# Patient Record
Sex: MALE | Race: WHITE | Employment: FULL TIME | URBAN - METROPOLITAN AREA
[De-identification: names, ages, dates, MRNs, and addresses within clinical notes are randomized per-mention and may not be internally consistent; named-entity substitution may affect disease eponyms.]

---

## 2017-03-06 ENCOUNTER — OFFICE VISIT - HEALTHEAST (OUTPATIENT)
Dept: INTERNAL MEDICINE | Facility: CLINIC | Age: 62
End: 2017-03-06

## 2017-03-06 DIAGNOSIS — M72.2 PLANTAR FASCIITIS: ICD-10-CM

## 2017-09-08 ENCOUNTER — RECORDS - HEALTHEAST (OUTPATIENT)
Dept: ADMINISTRATIVE | Facility: OTHER | Age: 62
End: 2017-09-08

## 2017-10-03 ENCOUNTER — AMBULATORY - HEALTHEAST (OUTPATIENT)
Dept: INTERNAL MEDICINE | Facility: CLINIC | Age: 62
End: 2017-10-03

## 2018-02-20 ENCOUNTER — COMMUNICATION - HEALTHEAST (OUTPATIENT)
Dept: INTERNAL MEDICINE | Facility: CLINIC | Age: 63
End: 2018-02-20

## 2018-02-21 ENCOUNTER — OFFICE VISIT - HEALTHEAST (OUTPATIENT)
Dept: INTERNAL MEDICINE | Facility: CLINIC | Age: 63
End: 2018-02-21

## 2018-02-21 DIAGNOSIS — Z13.220 SCREENING FOR HYPERLIPIDEMIA: ICD-10-CM

## 2018-02-21 DIAGNOSIS — Z13.1 SCREENING FOR DIABETES MELLITUS: ICD-10-CM

## 2018-02-21 DIAGNOSIS — E88.09 HYPOALBUMINEMIA: ICD-10-CM

## 2018-02-21 DIAGNOSIS — Z12.5 SCREENING FOR PROSTATE CANCER: ICD-10-CM

## 2018-02-21 DIAGNOSIS — Z12.11 SCREEN FOR COLON CANCER: ICD-10-CM

## 2018-02-21 DIAGNOSIS — M54.12 CERVICAL RADICULOPATHY: ICD-10-CM

## 2018-02-21 ASSESSMENT — MIFFLIN-ST. JEOR: SCORE: 1561.03

## 2018-03-06 ENCOUNTER — HOSPITAL ENCOUNTER (OUTPATIENT)
Dept: PHYSICAL MEDICINE AND REHAB | Facility: CLINIC | Age: 63
Discharge: HOME | End: 2018-03-06
Attending: INTERNAL MEDICINE

## 2018-03-06 ENCOUNTER — OFFICE VISIT - HEALTHEAST (OUTPATIENT)
Dept: PHYSICAL THERAPY | Facility: REHABILITATION | Age: 63
End: 2018-03-06

## 2018-03-06 DIAGNOSIS — M54.2 CERVICALGIA: ICD-10-CM

## 2018-03-06 DIAGNOSIS — M79.18 MYOFASCIAL PAIN: ICD-10-CM

## 2018-03-06 DIAGNOSIS — M79.18 MYOFASCIAL MUSCLE PAIN: ICD-10-CM

## 2018-03-06 DIAGNOSIS — R29.3 POOR POSTURE: ICD-10-CM

## 2018-03-06 DIAGNOSIS — M54.12 CERVICAL RADICULAR PAIN: ICD-10-CM

## 2018-03-06 DIAGNOSIS — M54.12 CERVICAL RADICULOPATHY AT C7: ICD-10-CM

## 2018-03-06 ASSESSMENT — MIFFLIN-ST. JEOR: SCORE: 1533.81

## 2018-03-07 ENCOUNTER — COMMUNICATION - HEALTHEAST (OUTPATIENT)
Dept: PHYSICAL MEDICINE AND REHAB | Facility: CLINIC | Age: 63
End: 2018-03-07

## 2018-03-09 ENCOUNTER — RECORDS - HEALTHEAST (OUTPATIENT)
Dept: ADMINISTRATIVE | Facility: OTHER | Age: 63
End: 2018-03-09

## 2018-03-13 ENCOUNTER — OFFICE VISIT - HEALTHEAST (OUTPATIENT)
Dept: PHYSICAL THERAPY | Facility: REHABILITATION | Age: 63
End: 2018-03-13

## 2018-03-13 DIAGNOSIS — M54.12 CERVICAL RADICULOPATHY AT C7: ICD-10-CM

## 2018-03-13 DIAGNOSIS — R29.3 POOR POSTURE: ICD-10-CM

## 2018-03-13 DIAGNOSIS — M79.18 MYOFASCIAL MUSCLE PAIN: ICD-10-CM

## 2018-03-21 ENCOUNTER — COMMUNICATION - HEALTHEAST (OUTPATIENT)
Dept: INTERNAL MEDICINE | Facility: CLINIC | Age: 63
End: 2018-03-21

## 2018-08-14 ENCOUNTER — AMBULATORY - HEALTHEAST (OUTPATIENT)
Dept: INTERNAL MEDICINE | Facility: CLINIC | Age: 63
End: 2018-08-14

## 2018-08-14 ENCOUNTER — HOSPITAL ENCOUNTER (OUTPATIENT)
Dept: RADIOLOGY | Facility: CLINIC | Age: 63
Discharge: HOME | End: 2018-08-14
Attending: INTERNAL MEDICINE

## 2018-08-14 DIAGNOSIS — R07.81 RIB PAIN ON LEFT SIDE: ICD-10-CM

## 2018-11-11 ENCOUNTER — COMMUNICATION - HEALTHEAST (OUTPATIENT)
Dept: INTERNAL MEDICINE | Facility: CLINIC | Age: 63
End: 2018-11-11

## 2018-11-27 ENCOUNTER — OFFICE VISIT - HEALTHEAST (OUTPATIENT)
Dept: INTERNAL MEDICINE | Facility: CLINIC | Age: 63
End: 2018-11-27

## 2018-11-27 DIAGNOSIS — R20.0 HAND NUMBNESS: ICD-10-CM

## 2018-11-27 DIAGNOSIS — Z13.220 SCREENING FOR HYPERLIPIDEMIA: ICD-10-CM

## 2018-11-27 DIAGNOSIS — M54.2 CERVICAL PAIN (NECK): ICD-10-CM

## 2018-11-27 DIAGNOSIS — Z00.00 ROUTINE GENERAL MEDICAL EXAMINATION AT A HEALTH CARE FACILITY: ICD-10-CM

## 2018-11-27 DIAGNOSIS — Z12.11 ENCOUNTER FOR SCREENING COLONOSCOPY: ICD-10-CM

## 2018-11-27 DIAGNOSIS — E88.09 PROTEINS SERUM PLASMA LOW: ICD-10-CM

## 2018-11-27 DIAGNOSIS — Z13.1 SCREENING FOR DIABETES MELLITUS: ICD-10-CM

## 2018-11-27 DIAGNOSIS — Z12.5 SCREENING FOR PROSTATE CANCER: ICD-10-CM

## 2018-11-27 LAB
ALBUMIN SERPL-MCNC: 4.1 G/DL (ref 3.5–5)
ALP SERPL-CCNC: 64 U/L (ref 45–120)
ALT SERPL W P-5'-P-CCNC: 21 U/L (ref 0–45)
ANION GAP SERPL CALCULATED.3IONS-SCNC: 10 MMOL/L (ref 5–18)
AST SERPL W P-5'-P-CCNC: 26 U/L (ref 0–40)
BILIRUB SERPL-MCNC: 0.7 MG/DL (ref 0–1)
BUN SERPL-MCNC: 16 MG/DL (ref 8–22)
CALCIUM SERPL-MCNC: 9.8 MG/DL (ref 8.5–10.5)
CHLORIDE BLD-SCNC: 103 MMOL/L (ref 98–107)
CHOLEST SERPL-MCNC: 225 MG/DL
CO2 SERPL-SCNC: 28 MMOL/L (ref 22–31)
CREAT SERPL-MCNC: 0.97 MG/DL (ref 0.7–1.3)
ERYTHROCYTE [DISTWIDTH] IN BLOOD BY AUTOMATED COUNT: 12 % (ref 11–14.5)
FASTING STATUS PATIENT QL REPORTED: YES
GFR SERPL CREATININE-BSD FRML MDRD: >60 ML/MIN/1.73M2
GLUCOSE BLD-MCNC: 98 MG/DL (ref 70–125)
HBA1C MFR BLD: 5.6 % (ref 3.5–6)
HCT VFR BLD AUTO: 47.9 % (ref 40–54)
HDLC SERPL-MCNC: 83 MG/DL
HGB BLD-MCNC: 15.9 G/DL (ref 14–18)
LDLC SERPL CALC-MCNC: 128 MG/DL
MCH RBC QN AUTO: 31.1 PG (ref 27–34)
MCHC RBC AUTO-ENTMCNC: 33.2 G/DL (ref 32–36)
MCV RBC AUTO: 94 FL (ref 80–100)
PLATELET # BLD AUTO: 189 THOU/UL (ref 140–440)
PMV BLD AUTO: 7 FL (ref 7–10)
POTASSIUM BLD-SCNC: 3.9 MMOL/L (ref 3.5–5)
PROT SERPL-MCNC: 7.1 G/DL (ref 6–8)
PSA SERPL-MCNC: 1.4 NG/ML (ref 0–4.5)
RBC # BLD AUTO: 5.1 MILL/UL (ref 4.4–6.2)
SODIUM SERPL-SCNC: 141 MMOL/L (ref 136–145)
TRIGL SERPL-MCNC: 68 MG/DL
TSH SERPL DL<=0.005 MIU/L-ACNC: 0.98 UIU/ML (ref 0.3–5)
WBC: 7.7 THOU/UL (ref 4–11)

## 2018-11-27 RX ORDER — AZITHROMYCIN 500 MG/1
500 TABLET, FILM COATED ORAL DAILY
Qty: 6 TABLET | Refills: 0 | Status: SHIPPED | OUTPATIENT
Start: 2018-11-27 | End: 2021-12-10

## 2018-11-27 RX ORDER — ATOVAQUONE AND PROGUANIL HYDROCHLORIDE 250; 100 MG/1; MG/1
1 TABLET, FILM COATED ORAL
Qty: 18 TABLET | Refills: 0 | Status: SHIPPED | OUTPATIENT
Start: 2018-11-27 | End: 2021-12-10

## 2018-11-27 RX ORDER — ZOLPIDEM TARTRATE 10 MG/1
5-10 TABLET ORAL
Qty: 15 TABLET | Refills: 0 | Status: SHIPPED | OUTPATIENT
Start: 2018-11-27 | End: 2021-12-10

## 2018-11-27 ASSESSMENT — MIFFLIN-ST. JEOR: SCORE: 1548.56

## 2018-12-04 ENCOUNTER — AMBULATORY - HEALTHEAST (OUTPATIENT)
Dept: INTERNAL MEDICINE | Facility: CLINIC | Age: 63
End: 2018-12-04

## 2018-12-04 ENCOUNTER — HOSPITAL ENCOUNTER (OUTPATIENT)
Dept: MRI IMAGING | Facility: CLINIC | Age: 63
Discharge: HOME | End: 2018-12-04
Attending: INTERNAL MEDICINE

## 2018-12-04 ENCOUNTER — HOSPITAL ENCOUNTER (OUTPATIENT)
Dept: RADIOLOGY | Facility: CLINIC | Age: 63
Discharge: HOME | End: 2018-12-04
Attending: INTERNAL MEDICINE

## 2018-12-04 DIAGNOSIS — R20.0 HAND NUMBNESS: ICD-10-CM

## 2018-12-04 DIAGNOSIS — M54.2 CERVICAL PAIN (NECK): ICD-10-CM

## 2018-12-04 DIAGNOSIS — M47.22 OSTEOARTHRITIS OF SPINE WITH RADICULOPATHY, CERVICAL REGION: ICD-10-CM

## 2018-12-19 ENCOUNTER — HOSPITAL ENCOUNTER (OUTPATIENT)
Dept: PHYSICAL MEDICINE AND REHAB | Facility: CLINIC | Age: 63
Discharge: HOME | End: 2018-12-19
Attending: PHYSICAL MEDICINE & REHABILITATION

## 2018-12-19 DIAGNOSIS — R20.2 ARM PARESTHESIA, LEFT: ICD-10-CM

## 2018-12-19 DIAGNOSIS — M54.12 CERVICAL RADICULAR PAIN: ICD-10-CM

## 2018-12-19 DIAGNOSIS — M48.02 NEURAL FORAMINAL STENOSIS OF CERVICAL SPINE: ICD-10-CM

## 2018-12-19 DIAGNOSIS — M50.30 DDD (DEGENERATIVE DISC DISEASE), CERVICAL: ICD-10-CM

## 2018-12-19 ASSESSMENT — MIFFLIN-ST. JEOR: SCORE: 1547.42

## 2018-12-27 ENCOUNTER — HOSPITAL ENCOUNTER (OUTPATIENT)
Dept: PHYSICAL MEDICINE AND REHAB | Facility: CLINIC | Age: 63
Discharge: HOME | End: 2018-12-27
Attending: PHYSICAL MEDICINE & REHABILITATION

## 2018-12-27 DIAGNOSIS — R20.2 ARM PARESTHESIA, LEFT: ICD-10-CM

## 2018-12-27 DIAGNOSIS — M48.02 NEURAL FORAMINAL STENOSIS OF CERVICAL SPINE: ICD-10-CM

## 2018-12-27 DIAGNOSIS — M54.12 CERVICAL RADICULAR PAIN: ICD-10-CM

## 2018-12-27 ASSESSMENT — MIFFLIN-ST. JEOR: SCORE: 1547.42

## 2019-01-02 ENCOUNTER — COMMUNICATION - HEALTHEAST (OUTPATIENT)
Dept: INTERNAL MEDICINE | Facility: CLINIC | Age: 64
End: 2019-01-02

## 2019-05-23 ENCOUNTER — RECORDS - HEALTHEAST (OUTPATIENT)
Dept: ADMINISTRATIVE | Facility: OTHER | Age: 64
End: 2019-05-23

## 2019-07-18 ENCOUNTER — RECORDS - HEALTHEAST (OUTPATIENT)
Dept: ADMINISTRATIVE | Facility: OTHER | Age: 64
End: 2019-07-18

## 2019-12-03 ENCOUNTER — RECORDS - HEALTHEAST (OUTPATIENT)
Dept: ADMINISTRATIVE | Facility: OTHER | Age: 64
End: 2019-12-03

## 2020-05-05 ENCOUNTER — OFFICE VISIT - HEALTHEAST (OUTPATIENT)
Dept: INTERNAL MEDICINE | Facility: CLINIC | Age: 65
End: 2020-05-05

## 2020-05-05 DIAGNOSIS — R10.13 DYSPEPSIA: ICD-10-CM

## 2020-05-07 ENCOUNTER — AMBULATORY - HEALTHEAST (OUTPATIENT)
Dept: LAB | Facility: CLINIC | Age: 65
End: 2020-05-07

## 2020-05-07 DIAGNOSIS — R10.13 DYSPEPSIA: ICD-10-CM

## 2020-05-07 LAB
ALBUMIN SERPL-MCNC: 4 G/DL (ref 3.5–5)
ALBUMIN UR-MCNC: NEGATIVE MG/DL
ALP SERPL-CCNC: 60 U/L (ref 45–120)
ALT SERPL W P-5'-P-CCNC: 24 U/L (ref 0–45)
ANION GAP SERPL CALCULATED.3IONS-SCNC: 6 MMOL/L (ref 5–18)
APPEARANCE UR: CLEAR
AST SERPL W P-5'-P-CCNC: 22 U/L (ref 0–40)
BILIRUB SERPL-MCNC: 0.5 MG/DL (ref 0–1)
BILIRUB UR QL STRIP: NEGATIVE
BUN SERPL-MCNC: 13 MG/DL (ref 8–22)
CALCIUM SERPL-MCNC: 9.6 MG/DL (ref 8.5–10.5)
CHLORIDE BLD-SCNC: 103 MMOL/L (ref 98–107)
CO2 SERPL-SCNC: 30 MMOL/L (ref 22–31)
COLOR UR AUTO: NORMAL
CREAT SERPL-MCNC: 0.97 MG/DL (ref 0.7–1.3)
ERYTHROCYTE [DISTWIDTH] IN BLOOD BY AUTOMATED COUNT: 12.7 % (ref 11–14.5)
GFR SERPL CREATININE-BSD FRML MDRD: >60 ML/MIN/1.73M2
GLUCOSE BLD-MCNC: 94 MG/DL (ref 70–125)
GLUCOSE UR STRIP-MCNC: NEGATIVE MG/DL
HCT VFR BLD AUTO: 47.9 % (ref 40–54)
HGB BLD-MCNC: 15.3 G/DL (ref 14–18)
HGB UR QL STRIP: NEGATIVE
KETONES UR STRIP-MCNC: NEGATIVE MG/DL
LEUKOCYTE ESTERASE UR QL STRIP: NEGATIVE
LIPASE SERPL-CCNC: 17 U/L (ref 0–52)
MCH RBC QN AUTO: 30.7 PG (ref 27–34)
MCHC RBC AUTO-ENTMCNC: 31.9 G/DL (ref 32–36)
MCV RBC AUTO: 96 FL (ref 80–100)
NITRATE UR QL: NEGATIVE
PH UR STRIP: 5.5 [PH] (ref 4.5–8)
PLATELET # BLD AUTO: 197 THOU/UL (ref 140–440)
PMV BLD AUTO: 9.1 FL (ref 8.5–12.5)
POTASSIUM BLD-SCNC: 4.2 MMOL/L (ref 3.5–5)
PROT SERPL-MCNC: 7.3 G/DL (ref 6–8)
RBC # BLD AUTO: 4.98 MILL/UL (ref 4.4–6.2)
SODIUM SERPL-SCNC: 139 MMOL/L (ref 136–145)
SP GR UR STRIP: 1.01 (ref 1–1.03)
UROBILINOGEN UR STRIP-ACNC: NORMAL
WBC: 4.2 THOU/UL (ref 4–11)

## 2020-05-15 ENCOUNTER — RECORDS - HEALTHEAST (OUTPATIENT)
Dept: ADMINISTRATIVE | Facility: OTHER | Age: 65
End: 2020-05-15

## 2020-05-22 ENCOUNTER — AMBULATORY - HEALTHEAST (OUTPATIENT)
Dept: INTERNAL MEDICINE | Facility: CLINIC | Age: 65
End: 2020-05-22

## 2020-05-22 DIAGNOSIS — K30 DELAYED GASTRIC EMPTYING: ICD-10-CM

## 2020-06-05 ENCOUNTER — RECORDS - HEALTHEAST (OUTPATIENT)
Dept: ADMINISTRATIVE | Facility: OTHER | Age: 65
End: 2020-06-05

## 2020-08-03 ENCOUNTER — VIRTUAL VISIT (OUTPATIENT)
Dept: FAMILY MEDICINE | Facility: OTHER | Age: 65
End: 2020-08-03
Payer: COMMERCIAL

## 2020-08-04 ENCOUNTER — APPOINTMENT (OUTPATIENT)
Dept: LAB | Facility: CLINIC | Age: 65
End: 2020-08-04

## 2020-08-04 ENCOUNTER — RESULTS ONLY (OUTPATIENT)
Dept: LAB | Age: 65
End: 2020-08-04

## 2020-08-04 LAB
SARS-COV-2 RNA SPEC QL NAA+PROBE: NOT DETECTED
SPECIMEN SOURCE: NORMAL

## 2020-08-05 NOTE — PROGRESS NOTES
"Date: 2020 14:49:50  Clinician: Tenzin Small  Clinician NPI: [id number]  Patient: Scott Saucedo  Patient : 1955  Patient Address: [address] [place] [address]  Patient Phone: [phone]  Visit Protocol: URI  Patient Summary:  Scott is a 64 year old ( : 1955 ) male who initiated a Visit for COVID-19 (Coronavirus) evaluation and screening. When asked the question \"Please sign me up to receive news, health information and promotions from [employer].\", Scott responded \"No\".    Scott states his symptoms started today.   His symptoms consist of a sore throat and a cough.   Symptom details     Cough: Scott coughs a few times an hour and his cough is not more bothersome at night. Phlegm does not come into his throat when he coughs. He does not believe his cough is caused by post-nasal drip.     Sore throat: Scott reports having mild throat pain (1-3 on a 10 point pain scale), does not have exudate on his tonsils, and can swallow liquids. The lymph nodes in his neck are not enlarged. A rash has not appeared on the skin since the sore throat started.      Scott denies having wheezing, nausea, teeth pain, ageusia, diarrhea, myalgias, anosmia, facial pain or pressure, fever, nasal congestion, vomiting, rhinitis, malaise, ear pain, headache, chills, and enlarged lymph nodes. He also denies having recent facial or sinus surgery in the past 60 days and taking antibiotic medication in the past month. He is not experiencing dyspnea.   Precipitating events  Scott is not sure if he has been exposed to someone with strep throat. He has not recently been exposed to someone with influenza. Scott has been in close contact with the following high risk individuals: adults 65 or older, people with asthma, heart disease or diabetes, and children under the age of 5.   Pertinent COVID-19 (Coronavirus) information  In the past 14 days, Scott has not worked in a congregate living setting.   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 He provides direct patient care. 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 He lives with a healthcare worker.   Scott has not had a close contact with a laboratory-confirmed COVID-19 patient within 14 days of symptom onset.   Pertinent medical history  Scott does not need a return to work/school note.   Weight: 157 lbs   Scott does not smoke or use smokeless tobacco.   Weight: 157 lbs    MEDICATIONS: Prilosec OTC oral, ALLERGIES: NKDA  Clinician Response:  Dear Scott,   Your symptoms show that you may have coronavirus (COVID-19). This illness can cause fever, cough and trouble breathing. Many people get a mild case and get better on their own. Some people can get very sick.  What should I do?  We would like to test you for this virus.   1. Please call [phone] to schedule your visit. Explain that you were referred by [facility] to have a COVID-19 test. Be ready to share your [facility] visit ID number.  The following will serve as your written order for this COVID Test, ordered by me, for the indication of suspected COVID [Z20.828]: The test will be ordered in [employer] [facility], our electronic health record, after you are scheduled. It will show as ordered and authorized by Ga Wang MD.  Order: COVID-19 (Coronavirus) PCR for [facility]TOMATIC [facility] from [facility].      2. When it's time for your COVID test:  Stay at least 6 feet away from others. (If someone will drive you to your test, stay in the backseat, as far away from the  as you can.)   Cover your mouth and nose with a mask, tissue or washcloth.  Go straight to the testing site. Don't make any stops on the way there or back.      3.Starting now: Stay home and away from others (self-isolate) until:    "You've had no fever---and no medicine that reduces fever---for 3 full days (72 hours). And...   Your other symptoms have gotten better. For example, your cough or breathing has improved. And...   At least 10 days have passed since your symptoms started.       During this time, don't leave the house except for testing or medical care.   Stay in your own room, even for meals. Use your own bathroom if you can.   Stay away from others in your home. No hugging, kissing or shaking hands. No visitors.  Don't go to work, school or anywhere else.    Clean \"high touch\" surfaces often (doorknobs, counters, handles, etc.). Use a household cleaning spray or wipes. You'll find a full list of  on the EPA website: www.epa.gov/pesticide-registration/list-n-disinfectants-use-against-sars-cov-2.   Cover your mouth and nose with a mask, tissue or washcloth to avoid spreading germs.  Wash your hands and face often. Use soap and water.  Caregivers in these groups are at risk for severe illness due to COVID-19:  o People 65 years and older  o People who live in a nursing home or long-term care facility  o People with chronic disease (lung, heart, cancer, diabetes, kidney, liver, immunologic)  o People who have a weakened immune system, including those who:   Are in cancer treatment  Take medicine that weakens the immune system, such as corticosteroids  Had a bone marrow or organ transplant  Have an immune deficiency  Have poorly controlled HIV or AIDS  Are obese (body mass index of 40 or higher)  Smoke regularly   o Caregivers should wear gloves while washing dishes, handling laundry and cleaning bedrooms and bathrooms.  o Use caution when washing and drying laundry: Don't shake dirty laundry, and use the warmest water setting that you can.  o For more tips, go to www.cdc.gov/coronavirus/2019-ncov/downloads/10Things.pdf.    4.Sign up for [employer]. We know it's scary to hear that you might have COVID-19. We want to track your " symptoms to make sure you're okay over the next 2 weeks. Please look for an email from [employer]---this is a free, online program that we'll use to keep in touch. To sign up, follow the link in the email. Learn more at [email or web address]  How can I take care of myself?   Get lots of rest. Drink extra fluids (unless a doctor has told you not to).   Take Tylenol (acetaminophen) for fever or pain. If you have liver or kidney problems, ask your family doctor if it's okay to take Tylenol.   Adults can take either:    650 mg (two 325 mg pills) every 4 to 6 hours, or...   1,000 mg (two 500 mg pills) every 8 hours as needed.    Note: Don't take more than 3,000 mg in one day. Acetaminophen is found in many medicines (both prescribed and over-the-counter medicines). Read all labels to be sure you don't take too much.   For children, check the Tylenol bottle for the right dose. The dose is based on the child's age or weight.    If you have other health problems (like cancer, heart failure, an organ transplant or severe kidney disease): Call your specialty clinic if you don't feel better in the next 2 days.       Know when to call 911. Emergency warning signs include:    Trouble breathing or shortness of breath Pain or pressure in the chest that doesn't go away Feeling confused like you haven't felt before, or not being able to wake up Bluish-colored lips or face.  Where can I get more information?   [facility] -- About COVID-19: [email or web address]   CDC -- What to Do If You're Sick: www.cdc.gov/coronavirus/2019-ncov/about/steps-when-sick.html   CDC -- Ending Home Isolation: www.cdc.gov/coronavirus/2019-ncov/hcp/disposition-in-home-patients.html   CDC -- Caring for Someone: www.cdc.gov/coronavirus/2019-ncov/if-you-are-sick/care-for-someone.html   [facility] -- Interim Guidance for Hospital Discharge to Home: www.health.[facility].mn./diseases/coronavirus/hcp/hospdischarge.pdf   [facility]  [place] clinical trials (COVID-19 research studies): clinicalaffairs.[facility]-clinical-trials    Below are the COVID-19 hotlines at the [facility] of Health ([facility]). Interpreters are available.    For health questions: Call [phone] or 1-997.275.4747 (7 a.m. to 7 p.m.) For questions about schools and childcare: Call 630-821-3559 or 1-438.938.7316 (7 a.m. to 7 p.m.)    Diagnosis: Pain in throat  Diagnosis ICD: R07.0

## 2020-10-12 ENCOUNTER — COMMUNICATION - HEALTHEAST (OUTPATIENT)
Dept: INTERNAL MEDICINE | Facility: CLINIC | Age: 65
End: 2020-10-12

## 2020-10-12 ENCOUNTER — RESULTS ONLY (OUTPATIENT)
Dept: LAB | Age: 65
End: 2020-10-12

## 2020-10-12 ENCOUNTER — APPOINTMENT (OUTPATIENT)
Dept: LAB | Facility: CLINIC | Age: 65
End: 2020-10-12

## 2020-10-12 LAB
SARS-COV-2 RNA SPEC QL NAA+PROBE: NORMAL
SPECIMEN SOURCE: NORMAL

## 2020-10-13 LAB
LABORATORY COMMENT REPORT: NORMAL
SARS-COV-2 RNA SPEC QL NAA+PROBE: NEGATIVE
SPECIMEN SOURCE: NORMAL

## 2020-11-08 ENCOUNTER — HEALTH MAINTENANCE LETTER (OUTPATIENT)
Age: 65
End: 2020-11-08

## 2020-12-01 ENCOUNTER — HOSPITAL ENCOUNTER (OUTPATIENT)
Dept: PHYSICAL MEDICINE AND REHAB | Facility: CLINIC | Age: 65
Discharge: HOME | End: 2020-12-01
Attending: PHYSICAL MEDICINE & REHABILITATION

## 2020-12-01 DIAGNOSIS — M48.02 NEURAL FORAMINAL STENOSIS OF CERVICAL SPINE: ICD-10-CM

## 2020-12-01 DIAGNOSIS — R20.2 ARM PARESTHESIA, LEFT: ICD-10-CM

## 2020-12-01 DIAGNOSIS — M50.30 DDD (DEGENERATIVE DISC DISEASE), CERVICAL: ICD-10-CM

## 2021-02-04 ENCOUNTER — AMBULATORY - HEALTHEAST (OUTPATIENT)
Dept: INTERNAL MEDICINE | Facility: CLINIC | Age: 66
End: 2021-02-04

## 2021-02-04 ENCOUNTER — AMBULATORY - HEALTHEAST (OUTPATIENT)
Dept: LAB | Facility: CLINIC | Age: 66
End: 2021-02-04

## 2021-02-04 DIAGNOSIS — Z13.1 SCREENING FOR DIABETES MELLITUS: ICD-10-CM

## 2021-02-04 DIAGNOSIS — R10.13 DYSPEPSIA: ICD-10-CM

## 2021-02-04 DIAGNOSIS — Z12.5 SCREENING FOR PROSTATE CANCER: ICD-10-CM

## 2021-02-04 DIAGNOSIS — Z13.220 SCREENING FOR HYPERLIPIDEMIA: ICD-10-CM

## 2021-02-04 LAB
ALBUMIN SERPL-MCNC: 4.3 G/DL (ref 3.5–5)
ALP SERPL-CCNC: 67 U/L (ref 45–120)
ALT SERPL W P-5'-P-CCNC: 23 U/L (ref 0–45)
ANION GAP SERPL CALCULATED.3IONS-SCNC: 9 MMOL/L (ref 5–18)
AST SERPL W P-5'-P-CCNC: 25 U/L (ref 0–40)
BILIRUB SERPL-MCNC: 0.7 MG/DL (ref 0–1)
BUN SERPL-MCNC: 17 MG/DL (ref 8–22)
CALCIUM SERPL-MCNC: 9.3 MG/DL (ref 8.5–10.5)
CHLORIDE BLD-SCNC: 104 MMOL/L (ref 98–107)
CHOLEST SERPL-MCNC: 215 MG/DL
CO2 SERPL-SCNC: 28 MMOL/L (ref 22–31)
CREAT SERPL-MCNC: 0.97 MG/DL (ref 0.7–1.3)
ERYTHROCYTE [DISTWIDTH] IN BLOOD BY AUTOMATED COUNT: 13 % (ref 11–14.5)
FASTING STATUS PATIENT QL REPORTED: YES
GFR SERPL CREATININE-BSD FRML MDRD: >60 ML/MIN/1.73M2
GLUCOSE BLD-MCNC: 92 MG/DL (ref 70–125)
HBA1C MFR BLD: 5.3 %
HCT VFR BLD AUTO: 48.7 % (ref 40–54)
HDLC SERPL-MCNC: 85 MG/DL
HGB BLD-MCNC: 15.6 G/DL (ref 14–18)
LDLC SERPL CALC-MCNC: 121 MG/DL
MCH RBC QN AUTO: 30.8 PG (ref 27–34)
MCHC RBC AUTO-ENTMCNC: 32 G/DL (ref 32–36)
MCV RBC AUTO: 96 FL (ref 80–100)
PLATELET # BLD AUTO: 178 THOU/UL (ref 140–440)
PMV BLD AUTO: 8.8 FL (ref 7–10)
POTASSIUM BLD-SCNC: 4.1 MMOL/L (ref 3.5–5)
PROT SERPL-MCNC: 6.9 G/DL (ref 6–8)
PSA SERPL-MCNC: 2.1 NG/ML (ref 0–4.5)
RBC # BLD AUTO: 5.07 MILL/UL (ref 4.4–6.2)
SODIUM SERPL-SCNC: 141 MMOL/L (ref 136–145)
TRIGL SERPL-MCNC: 43 MG/DL
WBC: 4 THOU/UL (ref 4–11)

## 2021-05-30 VITALS — WEIGHT: 156.2 LBS | BODY MASS INDEX: 21.79 KG/M2

## 2021-06-01 VITALS — HEIGHT: 71 IN | BODY MASS INDEX: 22.12 KG/M2 | WEIGHT: 158 LBS

## 2021-06-01 VITALS — WEIGHT: 164 LBS | BODY MASS INDEX: 22.96 KG/M2 | HEIGHT: 71 IN

## 2021-06-02 VITALS — WEIGHT: 161 LBS | HEIGHT: 71 IN | BODY MASS INDEX: 22.54 KG/M2

## 2021-06-02 VITALS — HEIGHT: 71 IN | BODY MASS INDEX: 22.57 KG/M2 | WEIGHT: 161.25 LBS

## 2021-06-02 VITALS — WEIGHT: 161 LBS | BODY MASS INDEX: 22.54 KG/M2 | HEIGHT: 71 IN

## 2021-06-07 NOTE — PROGRESS NOTES
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      The patient has been notified of following:     \"This video visit will be conducted via a call between you and your physician/provider. We have found that certain health care needs can be provided without the need for an in-person physical exam.  This service lets us provide the care you need with a video conversation.  If a prescription is necessary we can send it directly to your pharmacy.  If lab work is needed we can place an order for that and you can then stop by our lab to have the test done at a later time.    Video visits are billed at different rates depending on your insurance coverage. Please reach out to your insurance provider with any questions.    If during the course of the call the physician/provider feels a video visit is not appropriate, you will not be charged for this service.\"    Patient has given verbal consent to a Video visit? Yes    Patient would like to receive their AVS by AVS Preference: [employer].    Patient would like the video invitation sent by: Text to cell phone: Per contact    Will anyone else be joining your video visit? No        Video Start Time: 8:30AM    Additional provider notes: GENERAL: healthy, alert and no distress  EYES: Eyes grossly normal to inspection, conjunctivae and sclerae normal  RESP: no audible wheeze, cough, or visible cyanosis.  No visible retractions or increased work of breathing.  Able to speak fully in complete sentences.  NEURO: Cranial nerves grossly intact, mentation intact and speech normal  PSYCH: mentation appears normal, affect normal/bright, judgement and insight intact, normal speech and appearance well-groomed      Video-Visit Details    Type of service:  Video Visit    Video End Time (time video stopped): 8:40AM  Originating Location (pt. Location): Home    Distant Location (provider location):  [facility] INTERNAL MEDICINE     Platform used for Video Visit: " Other: Face time      Scott Ramirez MD       Video Visit - Follow Up   Scott Saucedo MD   64 y.o. male    Date of Visit: 5/5/2020    Chief Complaint   Patient presents with     Consult        Assessment and Plan   1. Dyspepsia  Likely some gastritis or esophagitis/reflux.  Continue with omeprazole, reduction in fatty foods and alcohol in the evening, elevation of bed.  Check labs as below and I would recommend an upper endoscopy at some point in the near future.  Further evaluation if anything abnormal below.  - Ambulatory referral for Upper GI Endoscopy  - Lipase; Future  - Comprehensive Metabolic Panel; Future  - HM2(CBC w/o Differential); Future  - Urinalysis-UC if Indicated; Future      Return in about 2 weeks (around 5/19/2020) for video visit if symptoms fail to improve.     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  Started about a week ago.  Some pain in the epigastric area a little bit left of the epigastric area.  Has had a bit of a sore throat.  Some cottonmouth.  Some belching.  Some reflux symptoms as well.  3 days ago he started Prilosec.  He has reduce the amount of alcohol he drinks in the evening.  Some improvement.  He has had no difficulty swallowing.  Pain is not worsened with eating, actually improved.  No weight loss.  Mild nausea without vomiting.  No diarrhea constipation blood in the stool or black tarry stool.  No fever chills cough or body aches.  He does not use significant amount of NSAIDs.  Alcohol consumption usually 1-3 drinks per night.  Upper endoscopy many decades ago unremarkable.    Review of Systems: A comprehensive review of systems was negative except as noted.     Medications, Allergies and Problem List   Reviewed, reconciled and updated  Post Discharge Medication Reconciliation Status:      Additional Information   Current Outpatient Medications   Medication Sig Dispense Refill      atovaquone-proguanil (MALARONE) 250-100 mg Tab Take 1 tablet by mouth daily with breakfast. 18 tablet 0     azithromycin (ZITHROMAX) 500 MG tablet Take 1 tablet (500 mg total) by mouth daily For 1-3 days for traveler's diarrhea. 6 tablet 0     zolpidem (AMBIEN) 10 mg tablet Take 0.5-1 tablets (5-10 mg total) by mouth at bedtime as needed for sleep. 15 tablet 0     No current facility-administered medications for this visit.      No Known Allergies  Social History     Tobacco Use     Smoking status: Never Smoker     Smokeless tobacco: Never Used   Substance Use Topics     Alcohol use: Yes     Alcohol/week: 14.0 standard drinks     Types: 14 Standard drinks or equivalent per week     Drug use: No       Review and/or order of clinical lab tests:  Review and/or order of radiology tests:  Review and/or order of medicine tests:  Discussion of test results with performing physician:  Decision to obtain old records and/or obtain history from someone other than the patient:  Review and summarization of old records and/or obtaining history from someone other than the patient and.or discussion of case with another health care provider:  Independent visualization of image, tracing or specimen itself:    Time:      Scott Ramirez MD

## 2021-06-09 NOTE — PROGRESS NOTES
ASSESSMENT:    Plantar fasciitis, left foot    PLAN:  Eccentric gastrocnemius and soleus exercises were demonstrated.  He is going to try these at home for a couple of weeks and if he is not improved at that time he will contact me and we will get him set up for physical therapy.  Problem List Items Addressed This Visit     None          There are no discontinued medications.    No Follow-up on file.    There are no Patient Instructions on file for this visit.    CHIEF COMPLAINT:  Chief Complaint   Patient presents with     Heel Pain     Lt. foot problem (heel)       HISTORY OF PRESENT ILLNESS:  Scott Saucedo is a 61 y.o. male presenting to the clinic today with left foot pain. His left foot pain first presented 4-5 weeks ago and has progressively worsened since. His pain presents over the plantar, medial aspect of his left foot along his calcaneous. His pain worsens while standing for extended periods but never resolves fully. His pain is mildly worse in the morning. His pain has slowly progressed along the medial aspect of his foot and into his achilles. He denies any injury which precipitated his pain. He requests home therapy exercises for treatment of his symptoms.     REVIEW OF SYSTEMS:   He denies right foot pain. He feels well physically. All other systems are negative.    PFSH:  He presented to Dr. Scott Ramirez at the [facility] on 9/14/16 for his annual physical exam.   No Known Allergies    TOBACCO USE:  History   Smoking Status     Never Smoker   Smokeless Tobacco     Not on file       VITALS:  Vitals:    03/06/17 1319   BP: 110/68   Patient Site: Right Arm   Patient Position: Sitting   Cuff Size: Adult Regular   Pulse: 82   Weight: 156 lb 3.2 oz (70.9 kg)     Wt Readings from Last 3 Encounters:   03/06/17 156 lb 3.2 oz (70.9 kg)   09/14/16 158 lb (71.7 kg)         PHYSICAL EXAM:  Constitutional:  Reveals an alert and oriented x3, pleasant male with no acute distress.   MUSCULOSKELETAL:  Left foot: Significant tenderness with palpation over dorsomedial insertion of plantar fascia over calcaneous; anterior drawer is negative.       ADDITIONAL HISTORY SUMMARIZED (FROM OLD RECORDS OR HISTORY FROM SOMEONE OTHER THAN THE PATIENT OR ANOTHER HEALTHCARE PROVIDER), COLONOSCOPY (2 TOTAL): Reviewed annual physical exam note from Dr. Ramirez at [facility] on 9/14/16 regarding health maintenance.     DECISION TO OBTAIN EXTRA INFORMATION (OLD RECORDS REQUESTED OR HISTORY FROM ANOTHER PERSON OR ACCESSING CARE EVERYWHERE) (1 TOTAL):none    RADIOLOGY TESTS SUMMARIZED OR ORDERED (XRAY/CT/MRI/DXA/MAMMO) (1 TOTAL): none    LABS REVIEWED OR ORDERED (1 TOTAL): Reviewed labs from 9/14/16 and 9/29/16.    MEDICINE TESTS SUMMARIZED OR ORDERED (EKG/ECHO/EGD) (1 TOTAL): none    INDEPENDENT REVIEW OF EKG OR X-RAY (2 EACH): none      The visit lasted a total of 6 minutes face to face with the patient. Over 50% of the time was spent counseling and educating the patient about left plantar fascitis.    I, Octavio Ochoa, am scribing for and in the presence of, Dr. Ethan Saucedo.    I, Dr. Ethan Saucedo, personally performed the services described in this documentation, as scribed by Octavio Ochoa in my presence, and it is both accurate and complete.    MEDICATIONS:  No current outpatient prescriptions on file.     No current facility-administered medications for this visit.        Total data points: 3

## 2021-06-13 NOTE — PROGRESS NOTES
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      The patient has been notified of following:     \"This video visit will be conducted via a call between you and your physician/provider. We have found that certain health care needs can be provided without the need for an in-person physical exam.  This service lets us provide the care you need with a video conversation.  If a prescription is necessary we can send it directly to your pharmacy.  If lab work is needed we can place an order for that and you can then stop by our lab to have the test done at a later time.    Video visits are billed at different rates depending on your insurance coverage. Please reach out to your insurance provider with any questions.    If during the course of the call the physician/provider feels a video visit is not appropriate, you will not be charged for this service.\"    Patient has given verbal consent to a Video visit? Yes  How would you like to obtain your AVS? AVS Preference: [employer].  If dropped by the video visit, the video invitation should be sent to: Text to cell phone: [phone]  Will anyone else be joining your video visit? No        Video Start Time: 10:41 AM    Additional provider notes:     Assessment/Plan:      Diagnoses and all orders for this visit:    Arm paresthesia, left    Neural foraminal stenosis of cervical spine    DDD (degenerative disc disease), cervical        Assessment: Pleasant 64 y.o. male  Pleasant otherwise healthy 62-year-old gentleman with a history of posterior cervical decompression with:        1.  Persistent left arm and hand paresthesias to digits 2.  This is a constant paresthesia related to  severe foraminal stenosis on the left at C5-6 and moderate to severe bilaterally at C6-7 the C5-6 stenosis is dramatically worse on my review of the MRI images from 2018.  Symptoms are  likely related to C6 radiculopathy.     2.    Mild stiffness in the " cervical spine with prolonged extension while riding his bike.  Likely related to facet arthropathy.  He has significant multilevel degenerative disc disease facet arthropathy with multilevel foraminal stenosis.  Some mild central stenosis no high-grade spinal cord compression no myelopathic symptoms.       Discussion:    1. We discussed the diagnosis and treatment.  Reassurance provided regarding the central canal that there is no significant sign of stenosis or spinal cord compression and he has no myelopathic findings on exam.  We discussed options for the left finger numbness such as repeat EMG along with medication such as gabapentin or repeat MRI.  At this point his symptoms are stable and mild and he would like to continue to monitor them for now.  If he notices any weakness in his arm he will contact us and follow-up.    2.  Continue to be as active as possible and he may try cervical traction which she has at home.    3.  Follow-up as needed.      It was our pleasure caring for your patient today, if there any questions or concerns please do not hesitate to contact us.      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    History of Present Illness: Patient presents for follow-up of left finger numbness along with questions regarding the cervical spine MRI from 2 years ago.  He does continue to have some stiffness in his cervical spine with prolonged extension while biking but is able to continue to bike up to 40 miles and does well with that in general he has no neck pain and feels good.  He has no arm pain but has constant numbness in the left index finger at the tip.  This is constant but not too bothersome for him.  He does not have any weakness in his arm or radicular pain now.  Denies any balance changes or coordination deficits.    EMG: EMG performed in December 2018 was normal with no electrodiagnostic evidence of cervical radiculopathy or focal neuropathy in the left upper  extremity.    Imaging: MRI report and images were personally reviewed and discussed with the patient.  A plastic model was utilized during the discussion.  MRI of the cervical spine from 2018 personally reviewed showed multilevel degenerative disc disease and foraminal stenosis with multiple levels of disc osteophyte complex which mildly narrows the central canal but no spinal cord compression or spinal cord signal abnormality.  He has severe foraminal stenosis on the left at C5-6 moderate to severe bilaterally C6-7.  C5-6 is much more dramatic to me on reviewing images.    Review of Systems: Pertinent positives: Numbness in the left index finger.  Pertinent negatives: No   weakness.  No bowel or bladder incontinence.  No urinary retention.  No fevers, unintentional weight loss, balance changes, headaches, frequent falling, difficulty swallowing, or coordination difficulties.  All others reviewed are negative.         Past Medical History:   Diagnosis Date     Healthy adult on routine physical examination        The following portions of the patient's history were reviewed and updated as appropriate: allergies, current medications, past family history, past medical history, past social history, past surgical history and problem list.           Objective:   Physical Exam:      General:  Well-appearing male in no acute distress.  Pleasant,   cooperative, and interactive throughout the examination and interview.  HEENT: Head atraumatic normocephalic, sclera clear.  Skin: No rashes or lesions seen over the face.  Respirations unlabored.  MSK: Full range of motion of the cervical spine in flexion, extension, and rotation bilaterally.  Extremities:   Full shoulder abduction.     Neurologic exam: Mental status: Patient is alert and oriented with normal affect.  Attention, knowledge, memory, and language are intact.  Normal coordination throughout the examination.     Manual muscle testing : Appears to have at least  antigravity strength throughout the upper extremities with normal movements.         Video-Visit Details    Type of service:  Video Visit    Video End Time (time video stopped): 11:02 AM  Originating Location (pt. Location): Home    Distant Location (provider location):  [facility]     Platform used for Video Visit: Doximity per patient request      Tenzin Philip DO

## 2021-06-13 NOTE — PATIENT INSTRUCTIONS - HE
1.  Continue with normal activities.  You can try traction with your home unit if you wish.  Return to clinic if you note any weakness of your arm, progressive paresthesias balance changes or coordination changes.

## 2021-06-16 PROBLEM — Z86.0100 HISTORY OF COLONIC POLYPS: Status: ACTIVE | Noted: 2019-12-10

## 2021-06-16 NOTE — PROGRESS NOTES
[facility]   Cervical Thoracic Initial Evaluation    Patient Name: Scott Saucedo MD  Date of evaluation: 3/6/2018  Referral Diagnosis: Cervicalgia  Referring provider: Tenzin Philip DO  Visit Diagnosis:     ICD-10-CM    1. Cervical radiculopathy at C7 M54.12    2. Myofascial muscle pain M79.1    3. Poor posture R29.3        Assessment:        Patient is a 62 y.o. male that presents with signs and symptoms consistent with left sided C7 radiculopathy secondary to possible nerve impingement, bone spur or disc herniation. Patient demonstrates impairments including poor postural awareness with decreased cervical joint mobility and flexibility L>R leading to impaired functional mobility. Patient's functional limitations include sitting at work for long periods of time, looking left and up, sleeping throughout the night, and lifting objects. Today patient responded well to mechanical cervical traction and manual therapy, with immediate decrease of intensity and symptoms, from 4/10 to 1/10.    Patient educated on and demonstrated understanding of nature of impairment, plan of care, patient role and HEP. Patient compliant with PT and prognosis is good. Patient would benefit from skilled PT to progress and improve above impairments.      The POC is dynamic and will be modified on an ongoing basis.  Patient will return to clinic if symptoms persist.  Barriers to achieving goals as noted in the assessment section may affect outcome.  Prognosis to achieve goals is  good   Pt. is appropriate for skilled PT intervention as outlined in the Plan of Care (POC).  Pt. is a good candidate for skilled PT services to improve pain levels and function.    Goals:  Pt. will be independent with home exercise program in : 4 weeks  Pt. will have improved quality of sleep: with less pain;waking less times/night;in 4 weeks  Patient Turn Head: for driving;for watching traffic;for conversation;wiith no pain;with less  difficulty;in 6 weeks  Patient will look up / down: for reading;for computer work;with no pain;with less difficulty;in 6 weeks  Patient will decrease : NDI score;for improved quality of function;for improved quality of life;in 6 weeks  Pt will: demonstrate negative median ULNTT on L by 5 weeks.    Patient's expectations/goals are realistic.    Barriers to Learning or Achieving Goals:  Chronicity of the problem.       Plan / Patient Instructions:        Plan of Care:   Communication with: Referral Source  Patient Related Instruction: Nature of Condition;Treatment plan and rationale;Self Care instruction;Basis of treatment;Body mechanics;Posture;Next steps;Expected outcome  Times per Week: 1-2  Number of Weeks: 4-6  Number of Visits: 12  Therapeutic Exercise: ROM;Stretching;Strengthening  Neuromuscular Reeducation: kinesio tape;posture;TNE  Manual Therapy: soft tissue mobilization;myofascial release;joint mobilization;muscle energy  Modalities: TENS;traction    POC and pathology of condition were reviewed with patient.  Pt. is in agreement with the Plan of Care  A Home Exercise Program (HEP) was initiated today.  Pt. was instructed in exercises by PT and patient was given a handout with detailed instructions.    Plan for next visit: review HEP, trial traction unit again, add cervical isometrics, theraband row/extensions      Subjective:         History of Present Illness:    Scott is a 62 y.o. male who presents to therapy today with complaints of left sided neck pain. Date of onset is about 3 weeks ago and onset was gradual. Symptoms are intermittent and getting better. Patient reports he has had radicular symptoms in the past. He started getting some aching and tingling in his L side. He does [employer] and couldn't look up to the screen. He reports  an episodic  history of similar symptoms. He describes their previous level of function as not limited. Xrays will be done after this appointment. He typically sleeps on  his sides, the achiness in the neck in the back wakes him up.     Pain Ratin  Pain rating at best: 0  Pain rating at worst: 4  Pain description: aching, numbness, tingling and first two fingers and forearm tingling - C7 distribution    Functional limitations are described as occurring with:   performing routine daily activities  sitting for longer periods for work  sleeping  looking left and up    Patient reports benefit from:  movement or exercise , pain medication         Objective:      Note: Items left blank indicates the item was not performed or not indicated at the time of the evaluation.    Patient Outcome Measures :    Neck Disability Score in %: 22   Scores range from 0-100%, where a score of 0% represents minimal pain and maximal function. The minmal clinically important difference is a score reduction of 10%.    Cervical Thoracic Examination  1. Cervical radiculopathy at C7     2. Myofascial muscle pain     3. Poor posture       Involved side: Left  Posture Observation:      General sitting posture is  normal.  General standing posture is normal.    Cervical ROM:    Date: 3/6/2018     *Indicate scale AROM AROM AROM   Cervical Flexion 70 P     Cervical Extension 60      Right Left Right Left Right Left   Cervical Sidebending 45 P 50       Cervical Rotation 90 90       Cervical Protraction      Cervical Retraction      Thoracic Flexion      Thoracic Extension      Thoracic Sidebending         Thoracic Rotation           Strength   WFL and no pain reproduction  Date: 3/6/2018     Cervical Myotomes/5 Right Left Right Left Right Left   Cervical Flexion (C1-2)         Cervical Sidebending (C3)         Shoulder Elevation (C4)         Shoulder Abduction (C5)         Elbow Flexion (C6)         Elbow Extension (C7)         Wrist Flexion (C7)         Wrist Extension (C6)         Thumb abduction (C8)         Finger Abduction (T1)           Sensation         Reflex Testing  Cervical Dermatomes Right Left UE  Reflexes Right Left   Back of the Head (C2)   Biceps (C5-6)     Supraclavicular Fossa (C3)   Brachioradialis (C5-6)     AC Joint (C4)   Triceps (C7-8)  0   Lateral Biceps (C5)  diminished  Mu s test     Palmar Thumb (C6)   LE Reflexes     Palmar 3rd Finger (C7)  diminished Patellar (L3-4)     Palmar 5th Finger (C8)   Achilles (S1-2)     Ulnar Forearm (T1)   Babinski Response         Cervical Special Tests     Cervical Special Tests Right Left UE Nerve Mobility Right Left   Cervical compression   Median nerve  +   Cervical distraction   Ulnar nerve     Spurling s test   Radial nerve     Shoulder abduction sign   Thoracic outlet     Deep neck flexor endurance test   Harsha     Upper cervical rotation   Adson s     Sharper-Romi   Cervical rotation lateral flexion     Alar ligament test   Other:     Other:   Other:         Flexibility/Tissue Extensibility: decreased of cervical paraspinals, levators and UT on L>R  Palpation: TTP of 1st rib on L, cervical paraspinals   Passive Mobility-Joint Integrity: Hypomobile.      Treatment Today      Patient Education: Patient educated on plan of care, prognosis, PT/patient role and HEP. Patient educated on impairments related to condition and reproduction of symptoms. Patient instructed to focus on the small goals and this may be a long process to recovery, and that exercises at home are just as important as coming to therapy. Patient was educated on importance of activity modification and exercise consistency in order to see progress and change. Patient demonstrated and verbalized understanding.     Manual Therapy:  STM to cervical paraspinals, supraspinatus, UT and levator     Exercises:  Exercise #1: supine chin tuck - hold 5 sec x 10  Comment #1: scapular retraction in sitting - hold 5 sec perform all day long  Exercise #2: UT and levator stretch - hold 30 sec x 2 on L  Comment #2: pec doorway stretch - hold 30 sec x 2  Exercise #3: median nerve rockers and sliders on L  - 10-20 reps       TREATMENT MINUTES COMMENTS   Evaluation 20    Self-care/ Home management     Manual therapy 10 STM to cervical paraspinals, supraspinatus, UT and levator    Neuromuscular Re-education     Therapeutic Activity     Therapeutic Exercises 10 See flowsheet   Gait training     Modality___saunders cerivcal traction - 22lbs of force 30deg angle 10 Static hold 10 minutes - bolster under knees               Total 50    Blank areas are intentional and mean the treatment did not include these items.     PT Evaluation Code: (Please list factors)  Patient History/Comorbidities: cervicalgia  Examination: decreased cervical joint mobility, flexibility, tissue extensibility with poor posture  Clinical Presentation: uncomplicated  Clinical Decision Making: low    Patient History/  Comorbidities Examination  (body structures and functions, activity limitations, and/or participation restrictions) Clinical Presentation Clinical Decision Making (Complexity)   No documented Comorbidities or personal factors 1-2 Elements Stable and/or uncomplicated Low   1-2 documented comorbidities or personal factor 3 Elements Evolving clinical presentation with changing characteristics Moderate   3-4 documented comorbidities or personal factors 4 or more Unstable and unpredictable High                Monica Hansen, PT  3/6/2018  1:55 PM

## 2021-06-16 NOTE — PROGRESS NOTES
[facility] Discharge Summary  Patient Name: Scott Saucedo MD  Date: 4/16/2018  Referral Diagnosis: cervical pain  Referring provider: Scott Ramirez MD  Visit Diagnosis:   1. Cervical radiculopathy at C7     2. Myofascial muscle pain     3. Poor posture         Goals:  Pt. will be independent with home exercise program in : 4 weeks  Pt. will have improved quality of sleep: with less pain;waking less times/night;in 4 weeks  Patient Turn Head: for driving;for watching traffic;for conversation;wiith no pain;with less difficulty;in 6 weeks  Patient will look up / down: for reading;for computer work;with no pain;with less difficulty;in 6 weeks  Patient will decrease : NDI score;for improved quality of function;for improved quality of life;in 6 weeks  Pt will: demonstrate negative median ULNTT on L by 5 weeks.    Patient was seen for 2 visits for physical therapy of cervical pain from 3/6/18 to 3/13/18 with no follow up appointments.   The patient was instructed to follow up with physician's clinic.  No further therapy is required at this time.    Therapy will be discontinued at this time.  The patient will need a new referral to resume physical therapy treatment. Please see below for patient's current status.    Thank you for your referral.  Monica Hansen, PT, DPT  4/16/2018   10:17 AM      [facility] Daily Progress     Patient Name: Scott Sacuedo MD  Date: 3/13/2018  Visit #: 2/12  Referral Diagnosis: cervical pain  Referring provider: Scott Ramirez MD  Visit Diagnosis:     ICD-10-CM    1. Cervical radiculopathy at C7 M54.12    2. Myofascial muscle pain M79.1    3. Poor posture R29.3        Assessment:     Patient is a 62 y.o. male that presents with signs and symptoms consistent with left sided C7 radiculopathy secondary to possible nerve impingement, bone spur or disc herniation. Patient demonstrates impairments including poor postural awareness with decreased cervical  joint mobility and flexibility L>R leading to impaired functional mobility. Patient's functional limitations include sitting at work for long periods of time, looking left and up, sleeping throughout the night, and lifting objects.    Today patient reports maybe a slight change, still having twinges here and there into his L forearm. Xray shows severe degenerative changes as well as spinal stenosis. Cervical traction was performed today - patient continues to find benefit and relief.    HEP/POC compliance is  fair .  Patient demonstrates understanding/independence with home program.  Patient is appropriate to continue with skilled physical therapy intervention, as indicated by initial plan of care.    Goal Status:  Pt. will be independent with home exercise program in : 4 weeks  Pt. will have improved quality of sleep: with less pain;waking less times/night;in 4 weeks  Patient Turn Head: for driving;for watching traffic;for conversation;wiith no pain;with less difficulty;in 6 weeks  Patient will look up / down: for reading;for computer work;with no pain;with less difficulty;in 6 weeks  Patient will decrease : NDI score;for improved quality of function;for improved quality of life;in 6 weeks  Pt will: demonstrate negative median ULNTT on L by 5 weeks.      Plan / Patient Education:     Continue with initial plan of care.  Progress with home program as tolerated.    Plan for next visit: review HEP, trial traction unit again, add cervical isometrics, theraband row/extensions    Subjective:     Patient reports still having a little bit of tingling, maybe feeling a little bit better. Slight achiness in his left side. No difficulty with the exercises, he didn't do his exercises everyday.     Functional limitations are described as occurring with:   performing routine daily activities  sitting for longer periods for work  sleeping  looking left and up    Objective:        Cervical ROM:             Date: 3/6/2018        *Indicate scale AROM AROM AROM   Cervical Flexion 70 P       Cervical Extension 60         Right Left Right Left Right Left   Cervical Sidebending 45 P 50           Cervical Rotation 90 90                 Treatment Today       Patient Education: Patient was educated on continuing plan of care, progress and review of current HEP. Patient educated on importance of consistency with exercise and therapy, as well as activity modification in order to see change and improvements. Patient demonstrated and verbalized understanding.     Manual Therapy: None performed today  STM to cervical paraspinals, supraspinatus, UT and levator     Exercises:  Exercise #1: supine chin tuck - hold 5 sec x 10  Comment #1: scapular retraction in sitting - hold 5 sec perform all day long  Exercise #2: UT and levator stretch - hold 30 sec x 2 on L  Comment #2: pec doorway stretch - hold 30 sec x 2  Exercise #3: median nerve rockers and sliders on L - 10-20 reps       TREATMENT MINUTES COMMENTS   Evaluation     Self-care/ Home management     Manual therapy     Neuromuscular Re-education     Therapeutic Activity     Therapeutic Exercises 15 See above flowsheet; arm bike 3 minutes   Cervical isometrics added to HEP   Gait training     Modality___saunders cerivcal traction - 22lbs of force 30deg angle 12 (10 + 2 min set up) Static hold 10 minutes - no bolster under knees per patient request              Total 27    Blank areas are intentional and mean the treatment did not include these items.       Monica Hansen, PT  3/13/2018

## 2021-06-16 NOTE — PROGRESS NOTES
Assessment/Plan:      Diagnoses and all orders for this visit:    Cervicalgia  -     XR Cervical Spine 6 or More VWS; Future; Expected date: 3/6/18  -     Ambulatory referral to Physical Therapy    Cervical radicular pain  -     XR Cervical Spine 6 or More VWS; Future; Expected date: 3/6/18  -     Ambulatory referral to Physical Therapy    Myofascial pain  -     Ambulatory referral to Physical Therapy        Assessment: Pleasant otherwise healthy 62-year-old gentleman with a history of posterior cervical decompression with:    1.  3-4 week history of left arm pain and paresthesias with loss of triceps reflex most consistent with a left C7 radiculopathy.  2.  Myofascial pain cervical spine.      Discussion:    1.  I discussed the diagnoses and treatment options.  Discussed the option of imaging, therapy, interventions, medications.  He wants to start conservatively.  Has had some improvement with prednisone.  2.  Plain films of the cervical spine to evaluate extent of foraminal stenosis at C6-7 as well as extent of degenerative changes.  3.  Start physical therapy for cervical parascapular strengthening.  Recommend traction trial.    4.  Can continue over-the-counter medications as he is not interested in other medications at this time.  5.  Follow-up as needed if symptoms do not improve.      It was our pleasure caring for your patient today, if there any questions or concerns please do not hesitate to contact us.      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    History of Present Illness: Patient presents at the request of Dr. Ramirez for evaluation cervical spine pain left arm pain paresthesias.  He has a history of cervical decompression for radiculopathy in approximately 1991.  Has had waxing and waning of symptoms since that time but overall has done well for the past 10 years.  Typically in the past has had improvement with therapy and prednisone burst.  Approximately 3-4 weeks ago he  began having left parascapular pain left-sided neck pain with numbness and tingling digits 1 and 2 of the left hand.  He gets an achy pain in the shoulder and arm with some paresthesias in the forearm.  Seems to be worse with activities such as cycling and looking up and better with changing positions.  Also worse at the end of the day.  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    Has been seen by PCP.  Had prednisone burst which has improved some.  Has tried anti-inflammatories with no benefit.  No recent physical therapy or imaging.  Has not noticed any weakness in his arm.  Sleeping seems to better since the prednisone.      Imaging: None available    Review of Systems: Denies weakness, bowel or bladder incontinence, balance changes.  No rashes.Remainder of 12 point review systems negative unless listed above.    Past Medical History:   Diagnosis Date     Healthy adult on routine physical examination        The following portions of the patient's history were reviewed and updated as appropriate: allergies, current medications, past family history, past medical history, past social history, past surgical history and problem list.      WHO 5: 20    NDI Score: 18      Objective:   Physical Exam:    Vitals:    03/06/18 1200   BP: 100/68       General:  Well-appearing male in no acute distress.  Pleasant, cooperative, and interactive throughout the examination and interview.  CV: No lower extremity edema on inspection or paltation.  Lymphatics: No cervical lymphadenopathy palpated.  Eyes: sclera clear.  Skin: No rashes or lesions seen   Respirations unlabored.  MSK: Gait is normal.  Able to heel-toe walk without difficulty.  Negative Romberg.  Spine: normal AP curves of the C, T, and L spine.  Mild decreased cervical rotation left greater than right..  Palpation: Tenderness to palpation over left mid lower cervical paraspinals and mild upper trapezius hypertonic tissue textures.   Extremities: Full range of motion of the shoulders, elbows, and wrists with no effusions or tenderness to palpation.  Negative arm drop, empty can, and Speed's test bilaterally.  Negative Ambrose and Neer's test bilaterally.  Full range of motion of the  knees, and ankles from a seated position with no effusions or tenderness to palpation. No hypermobility of the upper or lower extremities.  Neurologic exam: Mental status: Patient is alert and oriented with normal affect.  Attention, knowledge, memory, and language are intact.  Normal coordination throughout the examination.  Reflexes are 0 left tricep, 2+ right, 2+ and symmetric biceps, , brachioradialis, patellar, and Achilles with Negative Mu's.  Sensation is decreased to light touch over left third digit, intact to light touch throughout the remainder of upper and lower extremities bilaterally.  Manual muscle testing reveals 5 out of 5 strength in the shoulder abductors, elbow flexors/extensors, wrist extensors, interosseous, and finger flexors; lower extremity strength appears grossly normal.   Normal muscle bulk and tone in the arms and legs.  Positive Spurling's to the left

## 2021-06-16 NOTE — PROGRESS NOTES
"  Office Visit - Follow Up   Scott Saucedo   62 y.o. male    Date of Visit: 2/21/2018    Chief Complaint   Patient presents with     Neck Pain        Assessment and Plan   1. Cervical radiculopathy  Likely cervical disc disease, steroid burst with taper prescribed.  Recommended physical medicine and rehab at the spine clinic, Dr. Tenzin Philip.  Discussed cervical traction.  Benny's isometric exercises.    2. Screen for colon cancer  - Ambulatory referral for Colonoscopy  - Ambulatory referral to [facility]    3. Screening for hyperlipidemia  - Lipid Cascade; Future    4. Screening for diabetes mellitus  - Glycosylated Hemoglobin A1c; Future    5. Screening for prostate cancer  - PSA (Prostatic-Specific Antigen), Annual Screen; Future    6. Hypoalbuminemia  - Comprehensive Metabolic Panel; Future      Return in about 4 weeks (around 3/21/2018) for recheck.     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  This is been going on for the past 5 days or so.  He notices numbness in the thumb and second finger as well as in the forearm and posterior aspect of the shoulder radiating up into his neck.  It hurts with certain movements of the neck.  He has a history of cervical disc disease and had a microdiscectomy back in about 1988.  He has had flares of neck pain including radicular neck pain and has done well with steroid burst.  He has no leg weakness no bowel or bladder issues.  No fever chills.  No headache or cranial nerve issues.  No injury.    Review of Systems: A comprehensive review of systems was negative except as noted.     Medications, Allergies and Problem List   Reviewed and updated     Physical Exam   General Appearance:   No acute distress    /66 (Patient Site: Left Arm, Patient Position: Sitting, Cuff Size: Adult Regular)  Pulse 62  Ht 5' 11\" (1.803 m)  Wt 164 lb (74.4 kg)  SpO2 98%  BMI 22.87 kg/m2    He has normal " range of motion of the cervical spine with some pain looking to the left.  He has normal strength and reflexes in the upper extremities.  Normal strength reflexes in lower extremity.  He has no muscle atrophy.  No fasciculation evident.     Additional Information   Current Outpatient Prescriptions   Medication Sig Dispense Refill     predniSONE (DELTASONE) 10 mg tablet Take 40mg by mouth daily for 3 days, then 30mg x 3 days, then 20mg x 3 days ,then 10mg x 3 days then stop 30 tablet 0     zolpidem (AMBIEN) 10 mg tablet Take 0.5-1 tablets (5-10 mg total) by mouth at bedtime as needed for sleep. 30 tablet 1     No current facility-administered medications for this visit.      No Known Allergies  Social History   Substance Use Topics     Smoking status: Never Smoker     Smokeless tobacco: Never Used     Alcohol use 8.4 oz/week     14 Standard drinks or equivalent per week       Review and/or order of clinical lab tests:  Review and/or order of radiology tests:  Review and/or order of medicine tests:  Discussion of test results with performing physician:  Decision to obtain old records and/or obtain history from someone other than the patient:  Review and summarization of old records and/or obtaining history from someone other than the patient and.or discussion of case with another health care provider:  Independent visualization of image, tracing or specimen itself:    Time:      Scott Ramirez MD

## 2021-06-18 NOTE — LETTER
Letter by Scott Ramirez MD at      Author: Scott Ramirez MD Service: -- Author Type: --    Filed:  Encounter Date: 1/2/2019 Status: (Other)       January 2, 2019     Patient: Scott Saucedo MD   YOB: 1955   Date of Visit: 1/2/2019           Immunization Exemption Letter: Yellow Fever      Destination: [facility]    Dates of Travel: Jan 5, 2019 - Jan 20, 2019    Today's Date: 1/2/19    To Whom It May Concern:    It is my medical opinion that Scott Lewis be exempted from the requirements for Yellow Fever Immunizations due to health reasons.    If you have any questions or concerns, please don't hesitate to call.    Sincerely,        Electronically signed by Scott Ramirez MD

## 2021-06-21 NOTE — PROGRESS NOTES
Office Visit - Physical   Scott Saucedo MD   62 y.o.  male    Date of visit: 11/27/2018  Physician: Scott Ramirez MD     Assessment and Plan   1. Routine general medical examination at a health care facility  This is a healthy 62-year-old man.  Ongoing healthy lifestyle discussed and recommended.  - Thyroid Cascade    2. Cervical pain (neck)  - MR Cervical Spine Without Contrast; Future    3. Hand numbness  - MR Cervical Spine Without Contrast; Future    4. Screening for diabetes mellitus  - Glycosylated Hemoglobin A1c    5. Screening for hyperlipidemia  - Lipid Cascade    6. Screening for prostate cancer  - PSA (Prostatic-Specific Antigen), Annual Screen    7. Encounter for screening colonoscopy  Will contact [place] [facility] to schedule his colonoscopy    8. Proteins serum plasma low  Likely in the context of increased fitness, healthier diet and some weight loss, has since normalized  - HM2(CBC w/o Differential)  - Comprehensive Metabolic Panel    Return in about 1 year (around 11/27/2019) for annual physical.     Chief Complaint   Chief Complaint   Patient presents with     Annual Exam     Travel Consult     medication and vacination        Patient Profile   Social History     Social History Narrative    He lives with his wife Deborah in [place], [place]. 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 He has 3 adult children, a son Irvin, who works in [employer], daughter missael Aldana who works at [employer] in the [employer] [facility], and his daughter Abiola who is in law school. 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         Past Medical History   Patient Active Problem List   Diagnosis     Actinic keratoses - Dr. Joy Rivera       Past Surgical History  He has a past surgical history that  includes Laminectomy and microdiscectomy cervical spine ().     History of Present Illness   This 62 y.o. old man comes in for annual physical.  His medical history was reviewed, electronic medical records obtained reflectance no.  Overall he is doing okay.  He continues to have numbness in his left second finger after a flare of cervical neck pain.  X-ray with a lot of degenerative changes.  He saw the spine clinic and did some physical therapy.  He will be traveling to [facility], needs medication for traveler's diarrhea, malaria prophylaxis and update to his typhoid vaccine.  Hepatitis A and hepatitis B are updated.  He continues to exercise regularly.  His younger brother recently  and his mother has slow-growing adenocarcinoma of the lung and is not seeking treatment.    Review of Systems: A comprehensive review of systems was negative except as noted.     Medications and Allergies   Current Outpatient Medications   Medication Sig Dispense Refill     atovaquone-proguanil (MALARONE) 250-100 mg Tab Take 1 tablet by mouth daily with breakfast. 18 tablet 0     azithromycin (ZITHROMAX) 500 MG tablet Take 1 tablet (500 mg total) by mouth daily For 1-3 days for traveler's diarrhea. 6 tablet 0     predniSONE (DELTASONE) 10 mg tablet Take 40mg by mouth daily for 3 days, then 30mg x 3 days, then 20mg x 3 days ,then 10mg x 3 days then stop (Patient taking differently: Take 40mg by mouth daily for 3 days, then 30mg x 3 days, then 20mg x 3 days ,then 10mg x 3 days then stop (Not taking).) 30 tablet 0     typhoid (VIVOTIF) SR capsule Take 1 capsule by mouth every other day for 4 doses. 4 capsule 0     zolpidem (AMBIEN) 10 mg tablet Take 0.5-1 tablets (5-10 mg total) by mouth at bedtime as needed for sleep. 15 tablet 0     No current facility-administered medications for this visit.      No Known Allergies     Family and Social History   Family History   Problem Relation Age of Onset     Hypertension Mother      Lung  "cancer Mother      Diabetes Father      Hypertension Father      Obesity Father      Atrial fibrillation Brother      Melanoma Brother      Developmental delay Brother      Seizures Brother      Hypertension Brother      No Medical Problems Daughter      No Medical Problems Daughter      No Medical Problems Son         Social History     Tobacco Use     Smoking status: Never Smoker     Smokeless tobacco: Never Used   Substance Use Topics     Alcohol use: Yes     Alcohol/week: 8.4 oz     Types: 14 Standard drinks or equivalent per week     Drug use: No        Physical Exam   General Appearance:   No acute distress    /62 (Patient Site: Left Arm, Patient Position: Sitting, Cuff Size: Adult Regular)   Pulse 68   Resp 16   Ht 5' 11\" (1.803 m)   Wt 161 lb 4 oz (73.1 kg)   BMI 22.49 kg/m      EYES: Eyelids, conjunctiva, and sclera were normal. Pupils were normal. Cornea, iris, and lens were normal bilaterally.  HEAD, EARS, NOSE, MOUTH, AND THROAT: Head and face were normal. Hearing was normal to voice and the ears were normal to external exam. Nose appearance was normal and there was no discharge. Oropharynx was normal.  NECK: Neck appearance was normal. There were no neck masses and the thyroid was not enlarged.  RESPIRATORY: Breathing pattern was normal and the chest moved symmetrically.  Percussion/auscultatory percussion was normal.  Lung sounds were normal and there were no abnormal sounds.  CARDIOVASCULAR: Heart rate and rhythm were normal.  S1 and S2 were normal and there were no extra sounds or murmurs. Peripheral pulses in arms and legs were normal.  Jugular venous pressure was normal.  There was no peripheral edema.  GASTROINTESTINAL: The abdomen was normal in contour.  Bowel sounds were present.  Percussion detected no organ enlargement or tenderness.  Palpation detected no tenderness, mass, or enlarged organs.   MUSCULOSKELETAL: Skeletal configuration was normal and muscle mass was normal for age. " Joint appearance was overall normal.  LYMPHATIC: There were no enlarged nodes.  SKIN/HAIR/NAILS: Skin color was normal.  There were no skin lesions.  Hair and nails were normal.  NEUROLOGIC: The patient was alert and oriented to person, place, time, and circumstance. Speech was normal. Cranial nerves were normal. Motor strength was normal for age. The patient was normally coordinated.  PSYCHIATRIC:  Mood and affect were normal and the patient had normal recent and remote memory. The patient's judgment and insight were normal.       Additional Information        Scott Ramirez MD  Internal Medicine  Contact me at [phone]

## 2021-06-22 NOTE — PROGRESS NOTES
Patient presents for left upper extremity EMG.  He has paresthesias in the left forearm to digits 2 and lateral aspect of digit 1.  Has cervical foraminal stenosis.    EMG/NCS  results: Please see scanned full report    Comment NCS: Normal study  1.  Normal nerve conduction studies left upper extremity.    Comment EMG: Normal study  1.  Normal needle EMG left upper extremity.    Interpretation: Normal study    1. There is no electrodiagnostic evidence of cervical radiculopathy, brachial plexopathy, or focal neuropathy in the left upper extremity.    Note: He will continue with his current plan of cervical traction physical therapy and monitor symptoms.  Will contact me if symptoms do not improve or worsen.    The testing was completed in its entirety by the physician.       It was our pleasure caring for your patient today, if there any questions or concerns please do not hesitate to contact us.

## 2021-06-22 NOTE — PROGRESS NOTES
Assessment/Plan:      Diagnoses and all orders for this visit:    Cervical radicular pain  -     EMG - Clinic; Future; Expected date: 12/19/2018    Neural foraminal stenosis of cervical spine  -     EMG - Clinic; Future; Expected date: 12/19/2018    DDD (degenerative disc disease), cervical    Arm paresthesia, left  -     EMG - Clinic; Future; Expected date: 12/19/2018        Assessment: Pleasant otherwise healthy 62-year-old gentleman with a history of posterior cervical decompression with:      1.  Persistent left arm and hand paresthesias to digits 2 and 1.  He has severe foraminal stenosis on the left at C5-6 and moderate to severe bilaterally at C6-7 although the C5-6 is much more dramatic on MRI images in my review.  Likely C6 radiculopathy.    2.  Significant multilevel degenerative disc disease facet arthropathy with multilevel foraminal stenosis.  Some mild central stenosis no high-grade spinal cord compression no myelopathic findings.    Discussion:    1. We discussed the diagnosis and treatment options we discussed Conservative options and is failed physical therapy.  He has home traction unit.  We discussed home exercises along with potential for cervical injection, further diagnostics such as EMG and surgical referral.  We also discussed medications which she is not interested in.      2.  EMG left upper extremity to further evaluate for peripheral nerve entrapment versus cervical radiculopathy.    3.  We will trial home traction on his home traction unit.  Also will continue home exercises    4.  Can follow-up after EMG.    30 minutes were spent with this patient in addition to any procedure with greater than 50% in counseling and coordination of care.    It was our pleasure caring for your patient today, if there any questions or concerns please do not hesitate to contact us.      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  His neck pain is been present for years and doing fairly well.  With persistent paresthesias he was seen by his primary care provider and had MRI ordered of the cervical spine.  He is here for further evaluation.    His symptoms with regards to neck pain are improved but continues to have the paresthesias in his hand.  Symptoms are worse with activities such as biking.  He goes to a cycle bar and anything where he is biking looking up causes paresthesias and some discomfort.  He denies any weakness.  Pain is a 2/10 today a 4/10 at worst.  Did go to a few visits with physical therapy gave him some nerve glides and tried some traction without much benefit.  Has had the MRI and is here for evaluation.      Imaging: MRI report and images were personally reviewed and discussed with the patient.  A plastic model was utilized during the discussion.  MRI of the cervical spine personally reviewed.Shows multilevel degenerative disc disease most significant C4-5 and C5-6.  C4-5 posterior disc osteophyte complex with mild spinal stenosis severe bilateral foraminal stenosis but no high-grade spinal cord compression and no spinal cord signal abnormality throughout.  C5-6 there is a posterior disc osteophyte to the left with facet arthropathy resulting in moderate right and severe left foraminal stenosis.  At C6-7 there is also broad-based disc osteophyte complex with mild spinal stenosis and moderate to severe bilateral foraminal stenosis.  The most severe stenosis in the foramen on my review was at C5-6 on the left.  No spinal cord compression that is high-grade and no spinal cord signal abnormal    Review of Systems: Complains of paresthesias left arm and hand.  No bowel or bladder incontinence no weakness.  No headaches dizziness nausea vomiting blurred vision or balance changes.    Past Medical History:   Diagnosis Date     Healthy  adult on routine physical examination        The following portions of the patient's history were reviewed and updated as appropriate: allergies, current medications, past family history, past medical history, past social history, past surgical history and problem list.      Objective:   Physical Exam:    Vitals:    12/19/18 0839   BP: 129/76   Pulse: 60       General: Alert and oriented with normal affect. Attention, knowledge, memory, and language are intact. No acute distress.   Eyes: Sclerae are clear.  Respirations: Unlabored.    Gait:  Nonantalgic.  Decreased cervical range of motion sidebending to the left and rotation to the left slightly versus right.  Negative Spurling's bilaterally.  Sensation is   decreased to light touch over left digit 2    Reflexes are 2+ and symmetric in the biceps triceps and brachioradialis with negative Hoffmans.      Manual muscle testing reveals:  Right /Left out of 5  5/5 shoulder abductors  5/5 elbow flexors  5/5 elbow extensors  5/5 wrist extensors  5/5 interosseus  5/5 finger flexors

## 2021-09-11 ENCOUNTER — HEALTH MAINTENANCE LETTER (OUTPATIENT)
Age: 66
End: 2021-09-11

## 2021-09-15 ENCOUNTER — TELEPHONE (OUTPATIENT)
Dept: INTERNAL MEDICINE | Facility: CLINIC | Age: 66
End: 2021-09-15

## 2021-09-15 DIAGNOSIS — M54.12 CERVICAL RADICULOPATHY: Primary | ICD-10-CM

## 2021-09-15 RX ORDER — METHYLPREDNISOLONE 4 MG
TABLET, DOSE PACK ORAL
Qty: 21 TABLET | Refills: 0 | Status: SHIPPED | OUTPATIENT
Start: 2021-09-15 | End: 2021-12-10

## 2021-09-15 NOTE — TELEPHONE ENCOUNTER
----- Message from Scott Saucedo MD sent at 9/15/2021  8:21 AM CDT -----  Dr. Ramirez,    I am quite certain that I have developed a L upper extremity radiculopathy with pain and numbness in the L shoulder and arm. Abrupt onset on 1st tee (had not played golf for 2 years prior). I have started some traction which has helped in past. Burst of prednisone has also helped in past.    Wondering if you would be OK with a short burst of prednisone. If you have availability, I could see you Friday morning after 10 am. I will be seeing patients at [facility] Friday afternoon.    If you are OK with prednisone my new pharmacy would be [address] [phone].    Thank you!!!!    Scott

## 2021-10-05 ENCOUNTER — VIRTUAL VISIT (OUTPATIENT)
Dept: PHYSICAL MEDICINE AND REHAB | Facility: CLINIC | Age: 66
End: 2021-10-05
Payer: COMMERCIAL

## 2021-10-05 DIAGNOSIS — M48.02 NEURAL FORAMINAL STENOSIS OF CERVICAL SPINE: ICD-10-CM

## 2021-10-05 DIAGNOSIS — R20.2 ARM PARESTHESIA, LEFT: ICD-10-CM

## 2021-10-05 DIAGNOSIS — M25.512 ACUTE PAIN OF LEFT SHOULDER: ICD-10-CM

## 2021-10-05 DIAGNOSIS — M54.12 CERVICAL RADICULAR PAIN: Primary | ICD-10-CM

## 2021-10-05 PROCEDURE — 99214 OFFICE O/P EST MOD 30 MIN: CPT | Mod: 95 | Performed by: PHYSICAL MEDICINE & REHABILITATION

## 2021-10-05 RX ORDER — GABAPENTIN 100 MG/1
CAPSULE ORAL
Qty: 90 CAPSULE | Refills: 1 | Status: SHIPPED | OUTPATIENT
Start: 2021-10-05 | End: 2021-12-10

## 2021-10-05 ASSESSMENT — PAIN SCALES - GENERAL: PAINLEVEL: MILD PAIN (3)

## 2021-10-05 NOTE — PROGRESS NOTES
Scott Saucedo  is a 65 year old  male who is being evaluated via a billable video visit.      How would you like to obtain your AVS? MyChart  If the video visit is dropped, the invitation should be resent by: Text to cell phone: [phone]  Will anyone else be joining your video visit? No      Video Start Time: 9:27 AM      Assessment/Plan:      Scott was seen today for neck pain and arm pain.    Diagnoses and all orders for this visit:    Cervical radicular pain  -     EMG; Future  -     gabapentin (NEURONTIN) 100 MG capsule; 100mg at bedtime x 3 days, then may increase to 200mg x 3 days, then may increase to 300mg at bedtime    Arm paresthesia, left  -     EMG; Future    Neural foraminal stenosis of cervical spine    Acute pain of left shoulder         Assessment: Pleasant 65 year old male with a history of posterior cervical decompression with:        1.  1 month history of left shoulder pain with left arm paresthesias and radicular pain in see 5-7 distribution.  This was after playing golf.  Had some benefit following Medrol pack but the pain returns.  With the left forearm paresthesias and chronic left digit 2 paresthesias, is difficult to know if this is an acute on chronic cervical radiculopathy and there may also be a component of intrinsic left shoulder pain as he has pain with shoulder abduction.  severe foraminal stenosis on the left at C5-6 and moderate to severe bilaterally at C6-7 the C5-6 stenosis is dramatically worse on my review of the MRI images from 2018.                Discussion:    1.  We discussed the diagnosis and treatment options.  Is difficult to know if this is from shoulder pathology versus cervical radicular nature.  We discussed potential for imaging the shoulder and neck along with EMG and medications.    2.  EMG left upper extremity to evaluate for acute or chronic cervical radiculopathy this will be with Dr. Philip.   At that time I can examine his shoulder as well and  determine the need for any further imaging.    3 trial gabapentin 100 mg at bedtime increasing to 300 mg to help with neuropathic pain at night and sleep.    4.  Follow-up at his earliest convenience for left upper extremity EMG..    It was our pleasure caring for your patient today, if there any questions or concerns please do not hesitate to contact us.      Subjective:   Patient ID: Scott Saucedo is a 65 year old male.    History of Present Illness: Patient presents for an evaluation of left arm paresthesias cervical spine pain and shoulder pain.  Have been doing very well with his cervical spine pain till 1 month ago.  He went and played golf.  He went to the driving range which seem to be okay but then on the first hole took a swing and felt pain in his shoulder.  Initially felt it was shoulder related but then began having tingling in the forearm on the left.  He has had ongoing paresthesias of left digit 1 following cervical radicular issues in the past.  With his pain in the shoulder he was seen by PCP given Medrol Dosepak which helped quite a bit but still having issues rated a 3/10 today.  He still gets pain with lifting his left shoulder in abduction and has a difficult time sleeping.  Has to prop his cervical spine up and avoid laying on his left side.  He does get numbness in the forearm which is ongoing.  His pain is improved with ibuprofen and is using cervical traction at home.      Imaging: *I personally reviewed MRI images cervical spine from 2018 showing severe bilateral foraminal stenosis C4-5 and severe left foraminal stenosis C5-6 and C7-T1.  Moderate to severe bilateral foraminal stenosis C6/7.  Mild to moderate central stenosis most significant C6-7.    Review of systems: Pertinent positives: Has numbness and tingling left arm.  Pertinent negatives: No   weakness.  No bowel or bladder incontinence.  No urinary retention.  No fevers, unintentional weight loss, balance changes, headaches,  frequent falling, difficulty swallowing, or coordination difficulties.  All others reviewed are negative.         Past Medical History:   Diagnosis Date     Healthy adult on routine physical examination        The following portions of the patient's history were reviewed and updated as appropriate: allergies, current medications, past family history, past medical history, past social history, past surgical history and problem list.               Objective:   Physical Exam:    Vitals:  No vitals were taken for this visit    General:  Well-appearing male in no acute distress.  Pleasant,   cooperative, and interactive throughout the examination and interview.  HEENT: Head atraumatic normocephalic, sclera clear.  Skin: No rashes or lesions seen over the face.  Respirations unlabored.  MSK: Appears to have full range of motion of the cervical spine with equivocal Spurling's to the left.  Decreased range of motion left shoulder abduction and with pain.  Appears to have mild decreased internal rotation of the left shoulder full external rotation equivocal self Ambrose.     Neurologic exam: Mental status: Patient is alert and oriented with normal affect.  Attention, knowledge, memory, and language are intact.  Normal coordination throughout the examination.     Manual muscle testing : Appears to have at least antigravity strength throughout the upper extremities with normal movements.      Video-Visit Details    Type of service:  Video Visit    Video End Time:9:48 AM    Originating Location (pt. Location): Home    Distant Location (provider location):  [facility]     Platform used for Video Visit: Doximity per patient request

## 2021-10-05 NOTE — PATIENT INSTRUCTIONS
1. EMG of you left arm with Dr Philip. Please call the office to schedule [phone]    2. Prescribed Gabapentin today, 100 mg tablets, to be titrated up to 3 tablets at bedtime as tolerated for your nerve pain. Please follow Gabapentin dosing chart below.     Gabapentin 100mg Dosing Chart    DATE  MORNING AFTERNOON BEDTIME    Day 1 0 0 1    Day 2 0 0 1    Day 3 0 0 1    Day 4 0 0 2    Day 5 0 0 2    Day 6 0 0 2    Day 7 0 0 3    Day 8 0 0 3    Day 9 0 0 3                                                                                                                                    Continue medication, taking 3 capsules at bedtime    Please call if you have any questions regarding how to take your medication  Clinic Phone # [phone]

## 2021-10-05 NOTE — LETTER
10/5/2021         RE: Scott Saucedo  [address] [facility] [address]        Dear Colleague,    Thank you for referring your patient, Scott Saucedo, to the [facility] [place]. Please see a copy of my visit note below.    Scott Saucedo  is a 65 year old  male who is being evaluated via a billable video visit.      How would you like to obtain your AVS? MyChart  If the video visit is dropped, the invitation should be resent by: Text to cell phone: [phone]  Will anyone else be joining your video visit? No      Video Start Time: 9:27 AM      Assessment/Plan:      Scott was seen today for neck pain and arm pain.    Diagnoses and all orders for this visit:    Cervical radicular pain  -     EMG; Future  -     gabapentin (NEURONTIN) 100 MG capsule; 100mg at bedtime x 3 days, then may increase to 200mg x 3 days, then may increase to 300mg at bedtime    Arm paresthesia, left  -     EMG; Future    Neural foraminal stenosis of cervical spine    Acute pain of left shoulder         Assessment: Pleasant 65 year old male with a history of posterior cervical decompression with:        1.  1 month history of left shoulder pain with left arm paresthesias and radicular pain in see 5-7 distribution.  This was after playing golf.  Had some benefit following Medrol pack but the pain returns.  With the left forearm paresthesias and chronic left digit 2 paresthesias, is difficult to know if this is an acute on chronic cervical radiculopathy and there may also be a component of intrinsic left shoulder pain as he has pain with shoulder abduction.  severe foraminal stenosis on the left at C5-6 and moderate to severe bilaterally at C6-7 the C5-6 stenosis is dramatically worse on my review of the MRI images from 2018.                Discussion:    1.  We discussed the diagnosis and treatment options.  Is difficult to know if this is from shoulder pathology versus cervical radicular nature.  We discussed  potential for imaging the shoulder and neck along with EMG and medications.    2.  EMG left upper extremity to evaluate for acute or chronic cervical radiculopathy this will be with Dr. Philip.   At that time I can examine his shoulder as well and determine the need for any further imaging.    3 trial gabapentin 100 mg at bedtime increasing to 300 mg to help with neuropathic pain at night and sleep.    4.  Follow-up at his earliest convenience for left upper extremity EMG..    It was our pleasure caring for your patient today, if there any questions or concerns please do not hesitate to contact us.      Subjective:   Patient ID: Scott Saucedo is a 65 year old male.    History of Present Illness: Patient presents for an evaluation of left arm paresthesias cervical spine pain and shoulder pain.  Have been doing very well with his cervical spine pain till 1 month ago.  He went and played golf.  He went to the driving range which seem to be okay but then on the first hole took a swing and felt pain in his shoulder.  Initially felt it was shoulder related but then began having tingling in the forearm on the left.  He has had ongoing paresthesias of left digit 1 following cervical radicular issues in the past.  With his pain in the shoulder he was seen by PCP given Medrol Dosepak which helped quite a bit but still having issues rated a 3/10 today.  He still gets pain with lifting his left shoulder in abduction and has a difficult time sleeping.  Has to prop his cervical spine up and avoid laying on his left side.  He does get numbness in the forearm which is ongoing.  His pain is improved with ibuprofen and is using cervical traction at home.      Imaging: *I personally reviewed MRI images cervical spine from 2018 showing severe bilateral foraminal stenosis C4-5 and severe left foraminal stenosis C5-6 and C7-T1.  Moderate to severe bilateral foraminal stenosis C6/7.  Mild to moderate central stenosis most significant  C6-7.    Review of systems: Pertinent positives: Has numbness and tingling left arm.  Pertinent negatives: No   weakness.  No bowel or bladder incontinence.  No urinary retention.  No fevers, unintentional weight loss, balance changes, headaches, frequent falling, difficulty swallowing, or coordination difficulties.  All others reviewed are negative.         Past Medical History:   Diagnosis Date     Healthy adult on routine physical examination        The following portions of the patient's history were reviewed and updated as appropriate: allergies, current medications, past family history, past medical history, past social history, past surgical history and problem list.               Objective:   Physical Exam:    Vitals:  No vitals were taken for this visit    General:  Well-appearing male in no acute distress.  Pleasant,   cooperative, and interactive throughout the examination and interview.  HEENT: Head atraumatic normocephalic, sclera clear.  Skin: No rashes or lesions seen over the face.  Respirations unlabored.  MSK: Appears to have full range of motion of the cervical spine with equivocal Spurling's to the left.  Decreased range of motion left shoulder abduction and with pain.  Appears to have mild decreased internal rotation of the left shoulder full external rotation equivocal self Ambrose.     Neurologic exam: Mental status: Patient is alert and oriented with normal affect.  Attention, knowledge, memory, and language are intact.  Normal coordination throughout the examination.     Manual muscle testing : Appears to have at least antigravity strength throughout the upper extremities with normal movements.      Video-Visit Details    Type of service:  Video Visit    Video End Time:9:48 AM    Originating Location (pt. Location): Home    Distant Location (provider location):  [facility]     Platform used for Video Visit: Doximity per patient request              Again, thank  you for allowing me to participate in the care of your patient.        Sincerely,        Tenzin Philip, DO

## 2021-10-13 ENCOUNTER — OFFICE VISIT - RIVER FALLS (OUTPATIENT)
Dept: FAMILY MEDICINE | Facility: CLINIC | Age: 66
End: 2021-10-13

## 2021-12-10 ENCOUNTER — OFFICE VISIT (OUTPATIENT)
Dept: INTERNAL MEDICINE | Facility: CLINIC | Age: 66
End: 2021-12-10
Payer: COMMERCIAL

## 2021-12-10 VITALS
HEIGHT: 71 IN | SYSTOLIC BLOOD PRESSURE: 116 MMHG | OXYGEN SATURATION: 99 % | DIASTOLIC BLOOD PRESSURE: 82 MMHG | WEIGHT: 157 LBS | HEART RATE: 58 BPM | BODY MASS INDEX: 21.98 KG/M2

## 2021-12-10 DIAGNOSIS — Z86.0100 HISTORY OF COLONIC POLYPS: ICD-10-CM

## 2021-12-10 DIAGNOSIS — Z13.220 SCREENING FOR HYPERLIPIDEMIA: ICD-10-CM

## 2021-12-10 DIAGNOSIS — Z12.5 SCREENING FOR PROSTATE CANCER: ICD-10-CM

## 2021-12-10 DIAGNOSIS — S46.012D TRAUMATIC INCOMPLETE TEAR OF LEFT ROTATOR CUFF, SUBSEQUENT ENCOUNTER: ICD-10-CM

## 2021-12-10 DIAGNOSIS — Z13.1 SCREENING FOR DIABETES MELLITUS: ICD-10-CM

## 2021-12-10 DIAGNOSIS — R39.198 DIFFICULTY URINATING: ICD-10-CM

## 2021-12-10 DIAGNOSIS — Z00.00 ANNUAL PHYSICAL EXAM: Primary | ICD-10-CM

## 2021-12-10 DIAGNOSIS — K21.9 GASTROESOPHAGEAL REFLUX DISEASE WITHOUT ESOPHAGITIS: ICD-10-CM

## 2021-12-10 DIAGNOSIS — M47.22 OSTEOARTHRITIS OF SPINE WITH RADICULOPATHY, CERVICAL REGION: ICD-10-CM

## 2021-12-10 PROCEDURE — 99396 PREV VISIT EST AGE 40-64: CPT | Performed by: INTERNAL MEDICINE

## 2021-12-10 ASSESSMENT — MIFFLIN-ST. JEOR: SCORE: 1521.34

## 2021-12-10 NOTE — PROGRESS NOTES
Office Visit - Physical   Scott Saucedo   65 year old  male    Date of visit: 12/10/2021  Physician: Scott Ramirez MD, MD     Assessment and Plan   1. Annual physical exam  This is a generally healthy 65-year-old man.  Ongoing healthy lifestyle discussed and recommended.  Up-to-date on cancer screening.  Recommend a shingles vaccine in order placed which he can get at his convenience    2. Screening for prostate cancer  - Prostate Specific Antigen Screen; Future    3. Screening for diabetes mellitus  - Hemoglobin A1c; Future    4. Screening for hyperlipidemia  - Lipid panel reflex to direct LDL Fasting; Future    5. Gastroesophageal reflux disease without esophagitis  Symptoms generally stable although still present.  He is considering establishing with a new gastroenterologist now that Dr. Stromberg is retiring.  - CBC with platelets; Future  - Comprehensive metabolic panel; Future    6. Difficulty urinating  Recommended urology consultation which she will consider.  We also discussed use of Flomax or Cialis  - UA reflex to Microscopic and Culture; Future    7. Traumatic incomplete tear of left rotator cuff, subsequent encounter  Doing well post steroid injection    8. Osteoarthritis of spine with radiculopathy, cervical region  Stable    9. History of colonic polyps  Recent colonoscopy 2019, 3-year follow-up has been recommended    Return in about 1 year (around 12/10/2022) for Routine preventive.     Chief Complaint   Chief Complaint   Patient presents with     Physical     fasting        Patient Profile   Social History     Social History Narrative    He lives with his wife Deborah in [place], [place]. 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 He has 3 adult children, Irvin, Katya, Abiola.  Five grandchildren.         Past Medical History   Patient Active Problem List   Diagnosis     Actinic keratoses - Dr. Joy Rivera     History of colonic polyps       Past Surgical History  He has a past  surgical history that includes Laminectomy And Microdiscectomy Cervical Spine (1988).     History of Present Illness   This 65 year old man comes in for annual physical.  Overall he is doing well.  He continues to exercise regularly.  He has ongoing reflux symptoms which he controls with nonpharmacologic methods.  Has previously seen Dr. Ramon and has been on antacids and had an EGD.  EGD generally looked okay although he had some food stuff in his stomach during that time of the EGD.  He does have some urinary symptoms, mainly difficulty initiating urinary stream.  No symptoms of retention.  He has had normal PSAs.  He has chronic issues with the cervical spine which is generally stable.  He had his left shoulder earlier this summer golfing and ended up seeing Dr. Reina and had an MRI which showed a partial rotator cuff tear and partial biceps tendon tear.  He was given an injection of steroid which helped significantly.    Review of Systems: A comprehensive review of systems was negative except as noted.     Medications and Allergies   Current Outpatient Medications   Medication Sig Dispense Refill     melatonin 3 MG CAPS Take 3 mg by mouth       No Known Allergies     Family and Social History   Family History   Problem Relation Age of Onset     Hypertension Mother      Lung Cancer Mother      Aortic stenosis Mother      Diabetes Father      Hypertension Father      Obesity Father      Atrial fibrillation Brother      Melanoma Brother      Developmental delay Brother      Seizure Disorder Brother      Hypertension Brother      No Known Problems Daughter      No Known Problems Daughter      No Known Problems Son         Social History     Tobacco Use     Smoking status: Never Smoker     Smokeless tobacco: Never Used   Substance Use Topics     Alcohol use: Yes     Alcohol/week: 14.0 standard drinks     Drug use: No        Physical Exam   General Appearance:   No acute distress    /82 (BP Location: Left  "arm, Patient Position: Sitting, Cuff Size: Adult Regular)   Pulse 58   Ht 1.791 m (5' 10.5\")   Wt 71.2 kg (157 lb)   SpO2 99%   BMI 22.21 kg/m      EYES: Eyelids, conjunctiva, and sclera were normal. Pupils were normal. Cornea, iris, and lens were normal bilaterally.  HEAD, EARS, NOSE, MOUTH, AND THROAT: Head and face were normal. Hearing was normal to voice and the ears were normal to external exam. Nose appearance was normal and there was no discharge. Oropharynx was normal.  NECK: Neck appearance was normal. There were no neck masses and the thyroid was not enlarged.  RESPIRATORY: Breathing pattern was normal and the chest moved symmetrically.  Percussion/auscultatory percussion was normal.  Lung sounds were normal and there were no abnormal sounds.  CARDIOVASCULAR: Heart rate and rhythm were normal.  S1 and S2 were normal and there were no extra sounds or murmurs. Peripheral pulses in arms and legs were normal.  Jugular venous pressure was normal.  There was no peripheral edema.  GASTROINTESTINAL: The abdomen was normal in contour.  Bowel sounds were present.  Percussion detected no organ enlargement or tenderness.  Palpation detected no tenderness, mass, or enlarged organs.   MUSCULOSKELETAL: Skeletal configuration was normal and muscle mass was normal for age. Joint appearance was overall normal.  LYMPHATIC: There were no enlarged nodes.  SKIN/HAIR/NAILS: Skin color was normal.  There were no skin lesions.  Hair and nails were normal.  NEUROLOGIC: The patient was alert and oriented to person, place, time, and circumstance. Speech was normal. Cranial nerves were normal. Motor strength was normal for age. The patient was normally coordinated.  PSYCHIATRIC:  Mood and affect were normal and the patient had normal recent and remote memory. The patient's judgment and insight were normal.       Additional Information        Scott Ramirez MD, MD  Internal Medicine  Contact me at [phone]  Answers for " HPI/ROS submitted by the patient on 12/10/2021  Frequency of exercise:: 2-3 days/week  Getting at least 3 servings of Calcium per day:: Yes  Diet:: Gluten-free/reduced  Taking medications regularly:: Yes  Medication side effects:: None  Bi-annual eye exam:: Yes  Dental care twice a year:: NO  Sleep apnea or symptoms of sleep apnea:: None  Additional concerns today:: Yes  Duration of exercise:: 15-30 minutes

## 2022-02-11 VITALS — SYSTOLIC BLOOD PRESSURE: 131 MMHG | DIASTOLIC BLOOD PRESSURE: 83 MMHG | HEART RATE: 56 BPM

## 2022-02-16 NOTE — PROGRESS NOTES
Patient:   BOB SEVILLA            MRN: [id number]            FIN: [id number]               Age:   65 years     Sex:  Male     :  1955   Associated Diagnoses:   Partial tear of left rotator cuff   Author:   Kory Hernandez MD      Visit Information      Date of Service: 10/13/2021 11:45 am  Performing Location: [facility] [place]  Encounter#: [id number]      Chief Complaint   10/13/2021 11:53 AM CDT  L shoulder pain - noticed after golfing. Happened a month ago. No previous injuries.        History of Present Illness   Patient in for shoulder injury.  Month ago on a hard golf swing he had pain in his left shoulder.  Is bothered him since.  Still a bit better but still not back to normal.  Worse with abduction and extension.  He does have some chronic problems with his neck but no change in the shoulder pain with neck movement.         Review of Systems   Musculoskeletal:  Negative except as documented in history of present illness.    Neurologic:  Negative.       Physical Examination   Vital Signs   10/13/2021 11:53 AM CDT Peripheral Pulse Rate 56 bpm  LOW    Pulse Site Radial artery    HR Method Electronic    Systolic Blood Pressure 131 mmHg  HI    Diastolic Blood Pressure 83 mmHg  HI    Mean Arterial Pressure 99 mmHg    BP Site Right arm    BP Method Electronic      General:  Alert and oriented, No acute distress.    Musculoskeletal:  Left shoulder reveals full range of motion today 5/5 strength increased pain we stress his internal rotators  .    Neurologic:  Alert, Oriented.       Impression and Plan   Diagnosis     Partial tear of left rotator cuff ([id number] M75.112).     Plan:  Patient with rotator cuff strain likely partial tear recommended avoiding repetitive overhead use but so work on range of motion every day wait another 8 weeks for resolution if not improving will get MRI  .

## 2022-02-16 NOTE — TELEPHONE ENCOUNTER
---------------------  From: Corie Brooks CMA   Sent: 11/3/2021 12:36:41 PM CDT  Subject: MRI      MRI faxed to [facility] per pt request.

## 2022-02-16 NOTE — NURSING NOTE
Comprehensive Intake Entered On:  10/13/2021 11:58 AM CDT    Performed On:  10/13/2021 11:53 AM CDT by Corie Brooks CMA               Summary   Chief Complaint :   L shoulder pain - noticed after golfing. Happened a month ago. No previous injuries.    Systolic Blood Pressure :   131 mmHg (HI)    Diastolic Blood Pressure :   83 mmHg (HI)    Mean Arterial Pressure :   99 mmHg   Peripheral Pulse Rate :   56 bpm (LOW)    BP Site :   Right arm   Pulse Site :   Radial artery   BP Method :   Electronic   HR Method :   Electronic   Corie Brooks CMA - 10/13/2021 11:53 AM CDT   Health Status   Allergies Verified? :   Yes   Medication History Verified? :   Yes   Pre-Visit Planning Status :   Not completed   Corie Brooks CMA - 10/13/2021 11:53 AM CDT   Meds / Allergies   (As Of: 10/13/2021 11:58:00 AM CDT)   Allergies (Active)   No Known Medication Allergies  Estimated Onset Date:   Unspecified ; Created By:   Corie Brooks CMA; Reaction Status:   Active ; Category:   Drug ; Substance:   No Known Medication Allergies ; Type:   Allergy ; Updated By:   Corie Brooks CMA; Reviewed Date:   10/13/2021 11:56 AM CDT        Medication List   (As Of: 10/13/2021 11:58:00 AM CDT)   Home Meds    ascorbic acid  :   ascorbic acid ; Status:   Documented ; Ordered As Mnemonic:   Vitamin C ; Simple Display Line:   daily, 0 Refill(s) ; Catalog Code:   ascorbic acid ; Order Dt/Tm:   10/13/2021 11:56:37 AM CDT            Social History   Social History   (As Of: 10/13/2021 11:58:00 AM CDT)   Tobacco:        Never (less than 100 in lifetime)   (Last Updated: 10/13/2021 11:56:58 AM CDT by Corie Brooks CMA)          Electronic Cigarette/Vaping:        Electronic Cigarette Use: Never.   (Last Updated: 10/13/2021 11:57:02 AM CDT by Corie Brooks CMA)

## 2022-07-06 ENCOUNTER — LAB (OUTPATIENT)
Dept: CARDIOLOGY | Facility: CLINIC | Age: 67
End: 2022-07-06
Payer: COMMERCIAL

## 2022-07-06 DIAGNOSIS — Z13.220 SCREENING FOR HYPERLIPIDEMIA: ICD-10-CM

## 2022-07-06 DIAGNOSIS — K21.9 GASTROESOPHAGEAL REFLUX DISEASE WITHOUT ESOPHAGITIS: ICD-10-CM

## 2022-07-06 DIAGNOSIS — Z13.1 SCREENING FOR DIABETES MELLITUS: ICD-10-CM

## 2022-07-06 DIAGNOSIS — Z12.5 SCREENING FOR PROSTATE CANCER: ICD-10-CM

## 2022-07-06 LAB
ALBUMIN SERPL-MCNC: 4.1 G/DL (ref 3.5–5)
ALP SERPL-CCNC: 60 U/L (ref 45–120)
ALT SERPL W P-5'-P-CCNC: 18 U/L (ref 0–45)
ANION GAP SERPL CALCULATED.3IONS-SCNC: 5 MMOL/L (ref 5–18)
AST SERPL W P-5'-P-CCNC: 23 U/L (ref 0–40)
BILIRUB SERPL-MCNC: 0.7 MG/DL (ref 0–1)
BUN SERPL-MCNC: 14 MG/DL (ref 8–22)
CALCIUM SERPL-MCNC: 9.9 MG/DL (ref 8.5–10.5)
CHLORIDE BLD-SCNC: 105 MMOL/L (ref 98–107)
CHOLEST SERPL-MCNC: 230 MG/DL
CO2 SERPL-SCNC: 32 MMOL/L (ref 22–31)
CREAT SERPL-MCNC: 1.01 MG/DL (ref 0.7–1.3)
ERYTHROCYTE [DISTWIDTH] IN BLOOD BY AUTOMATED COUNT: 13.6 % (ref 10–15)
FASTING STATUS PATIENT QL REPORTED: YES
GFR SERPL CREATININE-BSD FRML MDRD: 83 ML/MIN/1.73M2
GLUCOSE BLD-MCNC: 104 MG/DL (ref 70–125)
HBA1C MFR BLD: 5.5 %
HCT VFR BLD AUTO: 47.9 % (ref 40–53)
HDLC SERPL-MCNC: 91 MG/DL
HGB BLD-MCNC: 15.2 G/DL (ref 13.3–17.7)
LDLC SERPL CALC-MCNC: 129 MG/DL
MCH RBC QN AUTO: 30.9 PG (ref 26.5–33)
MCHC RBC AUTO-ENTMCNC: 31.7 G/DL (ref 31.5–36.5)
MCV RBC AUTO: 97 FL (ref 78–100)
PLATELET # BLD AUTO: 201 10E3/UL (ref 150–450)
POTASSIUM BLD-SCNC: 5.3 MMOL/L (ref 3.5–5)
PROT SERPL-MCNC: 7.3 G/DL (ref 6–8)
PSA SERPL-MCNC: 2.32 NG/ML (ref 0–4.5)
RBC # BLD AUTO: 4.92 10E6/UL (ref 4.4–5.9)
SODIUM SERPL-SCNC: 142 MMOL/L (ref 136–145)
TRIGL SERPL-MCNC: 49 MG/DL
WBC # BLD AUTO: 4 10E3/UL (ref 4–11)

## 2022-07-06 PROCEDURE — G0103 PSA SCREENING: HCPCS

## 2022-07-06 PROCEDURE — 83036 HEMOGLOBIN GLYCOSYLATED A1C: CPT

## 2022-07-06 PROCEDURE — 85027 COMPLETE CBC AUTOMATED: CPT

## 2022-07-06 PROCEDURE — 80061 LIPID PANEL: CPT

## 2022-07-06 PROCEDURE — 80053 COMPREHEN METABOLIC PANEL: CPT

## 2022-07-06 PROCEDURE — 36415 COLL VENOUS BLD VENIPUNCTURE: CPT

## 2022-10-30 ENCOUNTER — HEALTH MAINTENANCE LETTER (OUTPATIENT)
Age: 67
End: 2022-10-30

## 2022-11-08 ENCOUNTER — OFFICE VISIT (OUTPATIENT)
Dept: CARDIOLOGY | Facility: CLINIC | Age: 67
End: 2022-11-08
Payer: COMMERCIAL

## 2022-11-08 VITALS
BODY MASS INDEX: 21.28 KG/M2 | HEART RATE: 59 BPM | HEIGHT: 71 IN | WEIGHT: 152 LBS | SYSTOLIC BLOOD PRESSURE: 112 MMHG | DIASTOLIC BLOOD PRESSURE: 82 MMHG

## 2022-11-08 DIAGNOSIS — R07.9 EXERTIONAL CHEST PAIN: ICD-10-CM

## 2022-11-08 DIAGNOSIS — I49.3 PVC'S (PREMATURE VENTRICULAR CONTRACTIONS): Primary | ICD-10-CM

## 2022-11-08 PROCEDURE — 99203 OFFICE O/P NEW LOW 30 MIN: CPT | Performed by: INTERNAL MEDICINE

## 2022-11-08 RX ORDER — DILTIAZEM HYDROCHLORIDE 5 MG/ML
10 INJECTION INTRAVENOUS
Status: CANCELLED | OUTPATIENT
Start: 2022-11-08

## 2022-11-08 RX ORDER — DILTIAZEM HYDROCHLORIDE 5 MG/ML
5 INJECTION INTRAVENOUS
Status: CANCELLED | OUTPATIENT
Start: 2022-11-08

## 2022-11-08 RX ORDER — LIDOCAINE 40 MG/G
CREAM TOPICAL
Status: CANCELLED | OUTPATIENT
Start: 2022-11-08

## 2022-11-08 NOTE — PROGRESS NOTES
"  [facility]   [address], [place] [address]   [email or web address]   OFFICE: [phone]     CARDIOLOGY CLINIC NOTE     Thank you, Scott Ramirez, for asking the [facility] team to see Mr. Scott Saucedo to evaluate Consult (PVCs)        Assessment/Recommendations   Assessment:    1. PVCs due to palpitations - can occur in bigeminal pattern. Has been associated with a fullness in his chest as well as an episode of exertional chest pain the was relieved with rest. Symptoms could be consistent with underlying ischemic heart disease.    Plan:  1. Echocardiogram  2. CT coronary angiogram  3. Recommended OTC magnesium supplementation.  4. Follow up as needed.         History of Present Illness   Mr. Scott Saucedo is a 66 year old male with no prior cardiac history who presents for evaluation of PVCs.    Mr. Saucedo has been experiencing palpitations described as \"skipped beats\" frequently over the past 3-4 weeks. They are associated with a fullness in his chest and throat. He generally does not experience them while exercising, which he does on a regular basis. He denies pre-syncope or light headedness. He did have one episode while performing vigorous exercise in a spin class where he developed a tightness in his chest that resolved with reducing the intensity of his exercise. He wears an Apple Watch and confirmed that his symptoms correlated with PVCs, sometimes in isolation, trigeminy, or bigeminy. He does have a family history of sudden death in his father at the age of 67.    Other than noted above, Mr. Saucedo denies any chest pain/pressure/tightness, shortness of breath at rest or with exertion, light headedness/dizziness, pre-syncope, syncope, lower extremity swelling, palpitations, paroxysmal nocturnal dyspnea (PND), or orthopnea.     Cardiac Problems and Cardiac Diagnostics     Most Recent Cardiac testing:    No prior cardiac " "testing available.         Medications  Allergies   Current Outpatient Medications   Medication Sig Dispense Refill     melatonin 3 MG CAPS Take 3 mg by mouth        No Known Allergies     Physical Examination Review of Systems   Vitals: /82 (BP Location: Left arm, Patient Position: Sitting, Cuff Size: Adult Regular)   Pulse 59   Ht 1.803 m (5' 11\")   Wt 68.9 kg (152 lb)   BMI 21.20 kg/m    BMI= Body mass index is 21.2 kg/m .  Wt Readings from Last 3 Encounters:   11/08/22 68.9 kg (152 lb)   12/10/21 71.2 kg (157 lb)   11/27/18 73.1 kg (161 lb 4 oz)       General Appearance:   Pleasant male, appears stated age. no acute distress, healthy body habitus   ENT/Mouth: membranes moist, no apparent gingival bleeding.      EYES:  no scleral icterus, normal conjunctivae   Neck: no carotid bruits. supple   Respiratory:   lungs are clear to auscultation, no rales or wheezing, equal chest wall expansion    Cardiovascular:   Regular rhythm, normal rate. Normal first and second heart sounds with no murmurs, rubs, or gallops; the carotid and radial pulses are intact, Jugular venous pressure normal, no edema bilaterally    Abdomen/GI:  Soft, non-tender   Extremities: no cyanosis or clubbing   Skin: no xanthelasma, warm.    Heme/lymph/ Immunology No apparent bleeding noted.   Neurologic: Alert and oriented. normal gait, no tremors   Psychiatric: Pleasant, calm, appropriate affect.         Please refer above for cardiac ROS details.       Past History   Past Medical History:   Past Medical History:   Diagnosis Date     Healthy adult on routine physical examination         Past Surgical History:   Past Surgical History:   Procedure Laterality Date     LAMINECTOMY AND MICRODISCECTOMY CERVICAL SPINE  1988    C5-6        Family History:   Family History   Problem Relation Age of Onset     Hypertension Mother      Lung Cancer Mother      Aortic stenosis Mother      Diabetes Father      Hypertension Father      Obesity Father  "     Atrial fibrillation Brother      Melanoma Brother      Developmental delay Brother      Seizure Disorder Brother      Hypertension Brother      No Known Problems Daughter      No Known Problems Daughter      No Known Problems Son         Social History:   Social History     Socioeconomic History     Marital status:      Spouse name: Not on file     Number of children: Not on file     Years of education: Not on file     Highest education level: Not on file   Occupational History     Not on file   Tobacco Use     Smoking status: Never     Smokeless tobacco: Never   Substance and Sexual Activity     Alcohol use: Yes     Alcohol/week: 14.0 standard drinks     Drug use: No     Sexual activity: Yes     Partners: Female   Other Topics Concern     Not on file   Social History Narrative    He lives with his wife Deborah in [place], [place]. 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  Five grandchildren.      Social Determinants of Health     Financial Resource Strain: Not on file   Food Insecurity: Not on file   Transportation Needs: Not on file   Physical Activity: Not on file   Stress: Not on file   Social Connections: Not on file   Intimate Partner Violence: Not on file   Housing Stability: Not on file            Lab Results    Chemistry/lipid CBC Cardiac Enzymes/BNP/TSH/INR   Lab Results   Component Value Date    CHOL 230 (H) 07/06/2022    HDL 91 07/06/2022    TRIG 49 07/06/2022    BUN 14 07/06/2022     07/06/2022    CO2 32 (H) 07/06/2022    Lab Results   Component Value Date    WBC 4.0 07/06/2022    HGB 15.2 07/06/2022    HCT 47.9 07/06/2022    MCV 97 07/06/2022     07/06/2022    Lab Results   Component Value Date    TSH 0.98 11/27/2018

## 2022-11-08 NOTE — PATIENT INSTRUCTIONS
Try taking OTC magnesium supplement for PVCs. Magnesium malate before bed can also help with sleep. Slow Mag is another that others have found helpful.

## 2022-11-08 NOTE — LETTER
"11/8/2022    Scott Ramirez MD  [address] [facility] [address] [address]    RE: Scott HELTON Lewis       Dear Colleague,     I had the pleasure of seeing Scott Saucedo in the [facility].    [facility]   [address], [place] [address]   [email or web address]   OFFICE: [phone]     CARDIOLOGY CLINIC NOTE     Thank you, Scott Ramirez, for asking the [facility] team to see Mr. Scott Saucedo to evaluate Consult (PVCs)        Assessment/Recommendations   Assessment:    1. PVCs due to palpitations - can occur in bigeminal pattern. Has been associated with a fullness in his chest as well as an episode of exertional chest pain the was relieved with rest. Symptoms could be consistent with underlying ischemic heart disease.    Plan:  1. Echocardiogram  2. CT coronary angiogram  3. Recommended OTC magnesium supplementation.  4. Follow up as needed.         History of Present Illness   Mr. Scott Saucedo is a 66 year old male with no prior cardiac history who presents for evaluation of PVCs.    Mr. Saucedo has been experiencing palpitations described as \"skipped beats\" frequently over the past 3-4 weeks. They are associated with a fullness in his chest and throat. He generally does not experience them while exercising, which he does on a regular basis. He denies pre-syncope or light headedness. He did have one episode while performing vigorous exercise in a spin class where he developed a tightness in his chest that resolved with reducing the intensity of his exercise. He wears an Apple Watch and confirmed that his symptoms correlated with PVCs, sometimes in isolation, trigeminy, or bigeminy. He does have a family history of sudden death in his father at the age of 67.    Other than noted above, Mr. Saucedo denies any chest pain/pressure/tightness, shortness of breath at rest or with exertion, light headedness/dizziness, " "pre-syncope, syncope, lower extremity swelling, palpitations, paroxysmal nocturnal dyspnea (PND), or orthopnea.     Cardiac Problems and Cardiac Diagnostics     Most Recent Cardiac testing:    No prior cardiac testing available.         Medications  Allergies   Current Outpatient Medications   Medication Sig Dispense Refill     melatonin 3 MG CAPS Take 3 mg by mouth        No Known Allergies     Physical Examination Review of Systems   Vitals: /82 (BP Location: Left arm, Patient Position: Sitting, Cuff Size: Adult Regular)   Pulse 59   Ht 1.803 m (5' 11\")   Wt 68.9 kg (152 lb)   BMI 21.20 kg/m    BMI= Body mass index is 21.2 kg/m .  Wt Readings from Last 3 Encounters:   11/08/22 68.9 kg (152 lb)   12/10/21 71.2 kg (157 lb)   11/27/18 73.1 kg (161 lb 4 oz)       General Appearance:   Pleasant male, appears stated age. no acute distress, healthy body habitus   ENT/Mouth: membranes moist, no apparent gingival bleeding.      EYES:  no scleral icterus, normal conjunctivae   Neck: no carotid bruits. supple   Respiratory:   lungs are clear to auscultation, no rales or wheezing, equal chest wall expansion    Cardiovascular:   Regular rhythm, normal rate. Normal first and second heart sounds with no murmurs, rubs, or gallops; the carotid and radial pulses are intact, Jugular venous pressure normal, no edema bilaterally    Abdomen/GI:  Soft, non-tender   Extremities: no cyanosis or clubbing   Skin: no xanthelasma, warm.    Heme/lymph/ Immunology No apparent bleeding noted.   Neurologic: Alert and oriented. normal gait, no tremors   Psychiatric: Pleasant, calm, appropriate affect.         Please refer above for cardiac ROS details.       Past History   Past Medical History:   Past Medical History:   Diagnosis Date     Healthy adult on routine physical examination         Past Surgical History:   Past Surgical History:   Procedure Laterality Date     LAMINECTOMY AND MICRODISCECTOMY CERVICAL SPINE  1988    C5-6    "     Family History:   Family History   Problem Relation Age of Onset     Hypertension Mother      Lung Cancer Mother      Aortic stenosis Mother      Diabetes Father      Hypertension Father      Obesity Father      Atrial fibrillation Brother      Melanoma Brother      Developmental delay Brother      Seizure Disorder Brother      Hypertension Brother      No Known Problems Daughter      No Known Problems Daughter      No Known Problems Son         Social History:   Social History     Socioeconomic History     Marital status:      Spouse name: Not on file     Number of children: Not on file     Years of education: Not on file     Highest education level: Not on file   Occupational History     Not on file   Tobacco Use     Smoking status: Never     Smokeless tobacco: Never   Substance and Sexual Activity     Alcohol use: Yes     Alcohol/week: 14.0 standard drinks     Drug use: No     Sexual activity: Yes     Partners: Female   Other Topics Concern     Not on file   Social History Narrative    He lives with his wife Deborah in [place], [place]. 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. He has 3 adult children, Irvin, Katya, Abiola.  Five grandchildren.      Social Determinants of Health     Financial Resource Strain: Not on file   Food Insecurity: Not on file   Transportation Needs: Not on file   Physical Activity: Not on file   Stress: Not on file   Social Connections: Not on file   Intimate Partner Violence: Not on file   Housing Stability: Not on file            Lab Results    Chemistry/lipid CBC Cardiac Enzymes/BNP/TSH/INR   Lab Results   Component Value Date    CHOL 230 (H) 07/06/2022    HDL 91 07/06/2022    TRIG 49 07/06/2022    BUN 14 07/06/2022     07/06/2022    CO2 32 (H) 07/06/2022    Lab Results   Component Value Date    WBC 4.0 07/06/2022    HGB 15.2 07/06/2022    HCT 47.9 07/06/2022    MCV 97 07/06/2022     07/06/2022    Lab Results   Component Value Date    TSH 0.98  11/27/2018                Thank you for allowing me to participate in the care of your patient.      Sincerely,     Artemio Marie MD     [facility]  cc:   No referring provider defined for this encounter.

## 2022-11-10 ENCOUNTER — HOSPITAL ENCOUNTER (OUTPATIENT)
Dept: CARDIOLOGY | Facility: CLINIC | Age: 67
Discharge: HOME | End: 2022-11-10
Attending: INTERNAL MEDICINE
Payer: COMMERCIAL

## 2022-11-10 ENCOUNTER — HOSPITAL ENCOUNTER (OUTPATIENT)
Dept: CT IMAGING | Facility: CLINIC | Age: 67
Discharge: HOME | End: 2022-11-10
Attending: INTERNAL MEDICINE
Payer: COMMERCIAL

## 2022-11-10 VITALS
DIASTOLIC BLOOD PRESSURE: 68 MMHG | HEIGHT: 71 IN | WEIGHT: 152 LBS | SYSTOLIC BLOOD PRESSURE: 106 MMHG | BODY MASS INDEX: 21.28 KG/M2 | HEART RATE: 60 BPM

## 2022-11-10 DIAGNOSIS — I49.3 PVC'S (PREMATURE VENTRICULAR CONTRACTIONS): Primary | ICD-10-CM

## 2022-11-10 DIAGNOSIS — R07.9 EXERTIONAL CHEST PAIN: ICD-10-CM

## 2022-11-10 DIAGNOSIS — I49.3 PVC'S (PREMATURE VENTRICULAR CONTRACTIONS): ICD-10-CM

## 2022-11-10 DIAGNOSIS — I42.9 CARDIOMYOPATHY (H): ICD-10-CM

## 2022-11-10 LAB
BI-PLANE LVEF ECHO: NORMAL
BSA FOR ECHO PROCEDURE: 1.88 M2
CCTA ASCENDING AORTA: 3
CCTA SINUS: 3.4
CREAT BLD-MCNC: 1 MG/DL (ref 0.7–1.3)
CV CALCIUM SCORE AGATSTON LM: 0
CV CALCIUM SCORING AGATSON LAD: 0
CV CALCIUM SCORING AGATSTON CX: 0
CV CALCIUM SCORING AGATSTON RCA: 0
CV CALCIUM SCORING AGATSTON TOTAL: 0
GFR SERPL CREATININE-BSD FRML MDRD: >60 ML/MIN/1.73M2
LVEF ECHO: NORMAL

## 2022-11-10 PROCEDURE — 93306 TTE W/DOPPLER COMPLETE: CPT

## 2022-11-10 PROCEDURE — 250N000009 HC RX 250: Performed by: INTERNAL MEDICINE

## 2022-11-10 PROCEDURE — 93306 TTE W/DOPPLER COMPLETE: CPT | Mod: 26 | Performed by: INTERNAL MEDICINE

## 2022-11-10 PROCEDURE — 75574 CT ANGIO HRT W/3D IMAGE: CPT

## 2022-11-10 PROCEDURE — 250N000011 HC RX IP 250 OP 636: Performed by: INTERNAL MEDICINE

## 2022-11-10 PROCEDURE — 250N000013 HC RX MED GY IP 250 OP 250 PS 637: Performed by: INTERNAL MEDICINE

## 2022-11-10 PROCEDURE — 75574 CT ANGIO HRT W/3D IMAGE: CPT | Mod: 26 | Performed by: GENERAL ACUTE CARE HOSPITAL

## 2022-11-10 PROCEDURE — 82565 ASSAY OF CREATININE: CPT

## 2022-11-10 RX ORDER — NITROGLYCERIN 0.4 MG/1
0.4 TABLET SUBLINGUAL ONCE
Status: COMPLETED | OUTPATIENT
Start: 2022-11-10 | End: 2022-11-10

## 2022-11-10 RX ORDER — IOPAMIDOL 755 MG/ML
90 INJECTION, SOLUTION INTRAVASCULAR ONCE
Status: COMPLETED | OUTPATIENT
Start: 2022-11-10 | End: 2022-11-10

## 2022-11-10 RX ORDER — METOPROLOL TARTRATE 1 MG/ML
5 INJECTION, SOLUTION INTRAVENOUS
Status: DISCONTINUED | OUTPATIENT
Start: 2022-11-10 | End: 2022-11-11 | Stop reason: HOSPADM

## 2022-11-10 RX ADMIN — NITROGLYCERIN 0.4 MG: 0.4 TABLET SUBLINGUAL at 08:54

## 2022-11-10 RX ADMIN — METOPROLOL TARTRATE 5 MG: 5 INJECTION INTRAVENOUS at 09:02

## 2022-11-10 RX ADMIN — IOPAMIDOL 90 ML: 755 INJECTION, SOLUTION INTRAVENOUS at 08:56

## 2022-11-11 ENCOUNTER — LAB (OUTPATIENT)
Dept: CARDIOLOGY | Facility: CLINIC | Age: 67
End: 2022-11-11
Payer: COMMERCIAL

## 2022-11-11 ENCOUNTER — HOSPITAL ENCOUNTER (OUTPATIENT)
Dept: CARDIOLOGY | Facility: CLINIC | Age: 67
Discharge: HOME | End: 2022-11-11
Attending: INTERNAL MEDICINE | Admitting: INTERNAL MEDICINE
Payer: COMMERCIAL

## 2022-11-11 DIAGNOSIS — I42.9 CARDIOMYOPATHY (H): ICD-10-CM

## 2022-11-11 DIAGNOSIS — I49.3 PVC'S (PREMATURE VENTRICULAR CONTRACTIONS): ICD-10-CM

## 2022-11-11 LAB
ANION GAP SERPL CALCULATED.3IONS-SCNC: 11 MMOL/L (ref 5–18)
BUN SERPL-MCNC: 13 MG/DL (ref 8–22)
CALCIUM SERPL-MCNC: 9.4 MG/DL (ref 8.5–10.5)
CHLORIDE BLD-SCNC: 103 MMOL/L (ref 98–107)
CO2 SERPL-SCNC: 30 MMOL/L (ref 22–31)
CREAT SERPL-MCNC: 0.99 MG/DL (ref 0.7–1.3)
GFR SERPL CREATININE-BSD FRML MDRD: 85 ML/MIN/1.73M2
GLUCOSE BLD-MCNC: 97 MG/DL (ref 70–125)
MAGNESIUM SERPL-MCNC: 2.3 MG/DL (ref 1.8–2.6)
POTASSIUM BLD-SCNC: 4.5 MMOL/L (ref 3.5–5)
SODIUM SERPL-SCNC: 144 MMOL/L (ref 136–145)
TSH SERPL DL<=0.005 MIU/L-ACNC: 1.07 UIU/ML (ref 0.3–5)

## 2022-11-11 PROCEDURE — 93226 XTRNL ECG REC<48 HR SCAN A/R: CPT

## 2022-11-11 PROCEDURE — 36415 COLL VENOUS BLD VENIPUNCTURE: CPT

## 2022-11-11 PROCEDURE — 83735 ASSAY OF MAGNESIUM: CPT

## 2022-11-11 PROCEDURE — 84443 ASSAY THYROID STIM HORMONE: CPT

## 2022-11-11 PROCEDURE — 80048 BASIC METABOLIC PNL TOTAL CA: CPT

## 2022-11-12 ENCOUNTER — HOSPITAL ENCOUNTER (OUTPATIENT)
Dept: MRI IMAGING | Facility: HOSPITAL | Age: 67
Discharge: HOME | End: 2022-11-12
Attending: INTERNAL MEDICINE
Payer: COMMERCIAL

## 2022-11-12 DIAGNOSIS — I42.9 CARDIOMYOPATHY (H): ICD-10-CM

## 2022-11-12 DIAGNOSIS — I49.3 PVC'S (PREMATURE VENTRICULAR CONTRACTIONS): ICD-10-CM

## 2022-11-12 PROCEDURE — 75561 CARDIAC MRI FOR MORPH W/DYE: CPT

## 2022-11-12 PROCEDURE — A9585 GADOBUTROL INJECTION: HCPCS | Performed by: INTERNAL MEDICINE

## 2022-11-12 PROCEDURE — 255N000002 HC RX 255 OP 636: Performed by: INTERNAL MEDICINE

## 2022-11-12 PROCEDURE — 999N000122 MR MYOCARDIUM  OVERREAD

## 2022-11-12 PROCEDURE — 75561 CARDIAC MRI FOR MORPH W/DYE: CPT | Mod: 26 | Performed by: INTERNAL MEDICINE

## 2022-11-12 RX ORDER — GADOBUTROL 604.72 MG/ML
14 INJECTION INTRAVENOUS ONCE
Status: COMPLETED | OUTPATIENT
Start: 2022-11-12 | End: 2022-11-12

## 2022-11-12 RX ADMIN — GADOBUTROL 14 ML: 604.72 INJECTION INTRAVENOUS at 11:59

## 2022-11-15 ENCOUNTER — TRANSFERRED RECORDS (OUTPATIENT)
Dept: HEALTH INFORMATION MANAGEMENT | Facility: CLINIC | Age: 67
End: 2022-11-15

## 2022-11-17 PROCEDURE — 93227 XTRNL ECG REC<48 HR R&I: CPT | Performed by: INTERNAL MEDICINE

## 2023-01-04 ENCOUNTER — TRANSFERRED RECORDS (OUTPATIENT)
Dept: HEALTH INFORMATION MANAGEMENT | Facility: CLINIC | Age: 68
End: 2023-01-04

## 2023-04-08 ENCOUNTER — HEALTH MAINTENANCE LETTER (OUTPATIENT)
Age: 68
End: 2023-04-08

## 2023-04-12 ENCOUNTER — TRANSFERRED RECORDS (OUTPATIENT)
Dept: HEALTH INFORMATION MANAGEMENT | Facility: CLINIC | Age: 68
End: 2023-04-12

## 2023-06-05 ENCOUNTER — TELEPHONE (OUTPATIENT)
Dept: INTERNAL MEDICINE | Facility: CLINIC | Age: 68
End: 2023-06-05
Payer: COMMERCIAL

## 2023-06-05 DIAGNOSIS — Z13.1 SCREENING FOR DIABETES MELLITUS: ICD-10-CM

## 2023-06-05 DIAGNOSIS — Z12.5 SCREENING FOR PROSTATE CANCER: ICD-10-CM

## 2023-06-05 DIAGNOSIS — Z13.220 SCREENING FOR HYPERLIPIDEMIA: Primary | ICD-10-CM

## 2023-06-05 DIAGNOSIS — K21.9 GASTROESOPHAGEAL REFLUX DISEASE WITHOUT ESOPHAGITIS: ICD-10-CM

## 2023-06-05 NOTE — TELEPHONE ENCOUNTER
----- Message from Scott Saucedo MD sent at 6/5/2023  9:13 AM CDT -----  Dr. Ramirez,    Could I get some routine labs ordered including PSA and I will schedule a physical with you.    Not you job, but I could come Wed afternoon or anytime on Thursday this week for the physical?    Thanks,    Scott

## 2023-06-06 ENCOUNTER — LAB (OUTPATIENT)
Dept: CARDIOLOGY | Facility: CLINIC | Age: 68
End: 2023-06-06
Payer: COMMERCIAL

## 2023-06-06 DIAGNOSIS — K21.9 GASTROESOPHAGEAL REFLUX DISEASE WITHOUT ESOPHAGITIS: ICD-10-CM

## 2023-06-06 DIAGNOSIS — Z13.1 SCREENING FOR DIABETES MELLITUS: ICD-10-CM

## 2023-06-06 DIAGNOSIS — Z13.220 SCREENING FOR HYPERLIPIDEMIA: ICD-10-CM

## 2023-06-06 DIAGNOSIS — Z12.5 SCREENING FOR PROSTATE CANCER: ICD-10-CM

## 2023-06-06 LAB
ALBUMIN SERPL BCG-MCNC: 4.4 G/DL (ref 3.5–5.2)
ALP SERPL-CCNC: 65 U/L (ref 40–129)
ALT SERPL W P-5'-P-CCNC: 20 U/L (ref 10–50)
ANION GAP SERPL CALCULATED.3IONS-SCNC: 11 MMOL/L (ref 7–15)
AST SERPL W P-5'-P-CCNC: 28 U/L (ref 10–50)
BILIRUB SERPL-MCNC: 0.6 MG/DL
BUN SERPL-MCNC: 14.3 MG/DL (ref 8–23)
CALCIUM SERPL-MCNC: 9.5 MG/DL (ref 8.8–10.2)
CHLORIDE SERPL-SCNC: 103 MMOL/L (ref 98–107)
CHOLEST SERPL-MCNC: 194 MG/DL
CREAT SERPL-MCNC: 0.97 MG/DL (ref 0.67–1.17)
DEPRECATED HCO3 PLAS-SCNC: 27 MMOL/L (ref 22–29)
ERYTHROCYTE [DISTWIDTH] IN BLOOD BY AUTOMATED COUNT: 12.9 % (ref 10–15)
GFR SERPL CREATININE-BSD FRML MDRD: 87 ML/MIN/1.73M2
GLUCOSE SERPL-MCNC: 91 MG/DL (ref 70–99)
HBA1C MFR BLD: 5.3 %
HCT VFR BLD AUTO: 45.9 % (ref 40–53)
HDLC SERPL-MCNC: 77 MG/DL
HGB BLD-MCNC: 15.2 G/DL (ref 13.3–17.7)
LDLC SERPL CALC-MCNC: 109 MG/DL
MCH RBC QN AUTO: 30.6 PG (ref 26.5–33)
MCHC RBC AUTO-ENTMCNC: 33.1 G/DL (ref 31.5–36.5)
MCV RBC AUTO: 92 FL (ref 78–100)
NONHDLC SERPL-MCNC: 117 MG/DL
PLATELET # BLD AUTO: 179 10E3/UL (ref 150–450)
POTASSIUM SERPL-SCNC: 4.3 MMOL/L (ref 3.4–5.3)
PROT SERPL-MCNC: 7 G/DL (ref 6.4–8.3)
PSA SERPL DL<=0.01 NG/ML-MCNC: 2.08 NG/ML (ref 0–4.5)
RBC # BLD AUTO: 4.97 10E6/UL (ref 4.4–5.9)
SODIUM SERPL-SCNC: 141 MMOL/L (ref 136–145)
TRIGL SERPL-MCNC: 40 MG/DL
WBC # BLD AUTO: 3.7 10E3/UL (ref 4–11)

## 2023-06-06 PROCEDURE — 80053 COMPREHEN METABOLIC PANEL: CPT

## 2023-06-06 PROCEDURE — 85027 COMPLETE CBC AUTOMATED: CPT

## 2023-06-06 PROCEDURE — 80061 LIPID PANEL: CPT

## 2023-06-06 PROCEDURE — 83036 HEMOGLOBIN GLYCOSYLATED A1C: CPT

## 2023-06-06 PROCEDURE — G0103 PSA SCREENING: HCPCS

## 2023-06-06 PROCEDURE — 36415 COLL VENOUS BLD VENIPUNCTURE: CPT

## 2023-06-08 ENCOUNTER — OFFICE VISIT (OUTPATIENT)
Dept: INTERNAL MEDICINE | Facility: CLINIC | Age: 68
End: 2023-06-08
Payer: COMMERCIAL

## 2023-06-08 VITALS
OXYGEN SATURATION: 99 % | BODY MASS INDEX: 21.78 KG/M2 | WEIGHT: 155.6 LBS | HEIGHT: 71 IN | TEMPERATURE: 96.8 F | SYSTOLIC BLOOD PRESSURE: 96 MMHG | RESPIRATION RATE: 16 BRPM | HEART RATE: 60 BPM | DIASTOLIC BLOOD PRESSURE: 70 MMHG

## 2023-06-08 DIAGNOSIS — Z85.828 HISTORY OF SKIN CANCER: ICD-10-CM

## 2023-06-08 DIAGNOSIS — R35.0 BENIGN PROSTATIC HYPERPLASIA WITH URINARY FREQUENCY: ICD-10-CM

## 2023-06-08 DIAGNOSIS — I49.3 PVC'S (PREMATURE VENTRICULAR CONTRACTIONS): ICD-10-CM

## 2023-06-08 DIAGNOSIS — N40.1 BENIGN PROSTATIC HYPERPLASIA WITH URINARY FREQUENCY: ICD-10-CM

## 2023-06-08 DIAGNOSIS — Z86.0100 HISTORY OF COLONIC POLYPS: ICD-10-CM

## 2023-06-08 DIAGNOSIS — Z00.00 ANNUAL PHYSICAL EXAM: Primary | ICD-10-CM

## 2023-06-08 PROCEDURE — 99397 PER PM REEVAL EST PAT 65+ YR: CPT | Mod: 25 | Performed by: INTERNAL MEDICINE

## 2023-06-08 PROCEDURE — 99214 OFFICE O/P EST MOD 30 MIN: CPT | Mod: 25 | Performed by: INTERNAL MEDICINE

## 2023-06-08 PROCEDURE — 90471 IMMUNIZATION ADMIN: CPT | Performed by: INTERNAL MEDICINE

## 2023-06-08 PROCEDURE — 90677 PCV20 VACCINE IM: CPT | Performed by: INTERNAL MEDICINE

## 2023-06-08 RX ORDER — TADALAFIL 5 MG/1
5 TABLET ORAL EVERY 24 HOURS
Qty: 90 TABLET | Refills: 4 | Status: SHIPPED | OUTPATIENT
Start: 2023-06-08 | End: 2023-06-26

## 2023-06-08 ASSESSMENT — ENCOUNTER SYMPTOMS
PALPITATIONS: 0
HEMATOCHEZIA: 0
SHORTNESS OF BREATH: 0
JOINT SWELLING: 0
SORE THROAT: 0
HEADACHES: 0
HEMATURIA: 0
HEARTBURN: 1
CHILLS: 0
FREQUENCY: 1
MYALGIAS: 0
DIARRHEA: 0
CONSTIPATION: 0
ARTHRALGIAS: 0
DYSURIA: 0
NAUSEA: 0
FEVER: 0
WEAKNESS: 0
PARESTHESIAS: 0
EYE PAIN: 0
ABDOMINAL PAIN: 0
NERVOUS/ANXIOUS: 0
DIZZINESS: 0
COUGH: 0

## 2023-06-08 ASSESSMENT — PAIN SCALES - GENERAL: PAINLEVEL: NO PAIN (0)

## 2023-06-08 ASSESSMENT — ACTIVITIES OF DAILY LIVING (ADL): CURRENT_FUNCTION: NO ASSISTANCE NEEDED

## 2023-06-08 NOTE — PROGRESS NOTES
Office Visit - Physical   Scott HELTON Lewis   67 year old  male    Date of visit: 6/8/2023  Physician: Scott Ramirez MD     Assessment and Plan   1. Annual physical exam  This is a 67-year-old man with issues as discussed below    2. PVC's (premature ventricular contractions)  We will repeat an echocardiogram to show normalization of his ejection fraction, evaluation reviewed.  Of note he did have some emphysematous changes on his CT scan and we could consider pulmonary function testing and/or pulmonary consultation.  He does remain asymptomatic.  Addendum: I spoke with Dr. Saucedo and he reviewed the CT scan with pulmonary and changes were relatively minor based on the pulmonologist interpretation.  - Echocardiogram Complete; Future    3. Benign prostatic hyperplasia with urinary frequency  - tadalafil (CIALIS) 5 MG tablet; Take 1 tablet (5 mg) by mouth every 24 hours  Dispense: 90 tablet; Refill: 4    4. H/o basal cell - [facility]    5. History of colonic polyps  Colonoscopy performed 4/12/2023    Return in about 1 year (around 6/8/2024) for Routine preventive.     Chief Complaint   Chief Complaint   Patient presents with     Physical     Urinary Problem     Recheck Medication     Traveling to [facility] and would like discuss Ambien        Patient Profile   Social History     Social History Narrative    He lives with his wife Deborah in [place], [place]. 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  Seven grandchildren.         Past Medical History   Patient Active Problem List   Diagnosis     H/o basal cell - [facility]     History of colonic polyps       Past Surgical History  He has a past surgical history that includes Laminectomy And Microdiscectomy Cervical Spine (1988).     History of Present Illness   This 67 year old man comes in for annual physical.  We performed labs before his visit which all looked okay.  Additionally, this past fall  "he had some PVCs.  This led to evaluation with an echocardiogram which showed a mildly reduced ejection fraction of 45 to 50%.  He then had a CT coronary artery angiogram which was normal.  Included in this was a calcium score which was normal.  He then had a cardiac MRI which did not show any evidence of scar or infarction and confirmed mild reduction of his ejection fraction to 49%.  PVC burden was 2 or 3%.  All of this has had excellent exercise tolerance and no symptoms.  He did have some emphysematous changes in his lungs on CT scan.  He has some symptoms of nocturia and BPH as well which are mild    Review of Systems: A comprehensive review of systems was negative except as noted.     Medications and Allergies   Current Outpatient Medications   Medication Sig Dispense Refill     tadalafil (CIALIS) 5 MG tablet Take 1 tablet (5 mg) by mouth every 24 hours 90 tablet 4     melatonin 3 MG CAPS Take 3 mg by mouth       No Known Allergies     Family and Social History   Family History   Problem Relation Age of Onset     Hypertension Mother      Lung Cancer Mother      Aortic stenosis Mother      Diabetes Father      Hypertension Father      Obesity Father      Atrial fibrillation Brother      Melanoma Brother      Developmental delay Brother      Seizure Disorder Brother      Hypertension Brother      No Known Problems Daughter      No Known Problems Daughter      No Known Problems Son         Social History     Tobacco Use     Smoking status: Never     Smokeless tobacco: Never   Vaping Use     Vaping status: Never Used   Substance Use Topics     Alcohol use: Yes     Alcohol/week: 14.0 standard drinks of alcohol     Drug use: No        Physical Exam   General Appearance:   No acute distress    BP 96/70 (BP Location: Left arm, Patient Position: Sitting, Cuff Size: Adult Regular)   Pulse 60   Temp 96.8  F (36  C) (Tympanic)   Resp 16   Ht 1.803 m (5' 10.98\")   Wt 70.6 kg (155 lb 9.6 oz)   SpO2 99%   BMI 21.71 " kg/m      EYES: Eyelids, conjunctiva, and sclera were normal. Pupils were normal. Cornea, iris, and lens were normal bilaterally.  HEAD, EARS, NOSE, MOUTH, AND THROAT: Head and face were normal. Hearing was normal to voice and the ears were normal to external exam. Nose appearance was normal and there was no discharge. Oropharynx was normal.  NECK: Neck appearance was normal. There were no neck masses and the thyroid was not enlarged.  RESPIRATORY: Breathing pattern was normal and the chest moved symmetrically.  Percussion/auscultatory percussion was normal.  Lung sounds were normal and there were no abnormal sounds.  CARDIOVASCULAR: Heart rate and rhythm were normal.  S1 and S2 were normal and there were no extra sounds or murmurs. Peripheral pulses in arms and legs were normal.  There was no peripheral edema.  GASTROINTESTINAL: The abdomen was normal in contour.  Bowel sounds were present.  Percussion detected no organ enlargement or tenderness.  Palpation detected no tenderness, mass, or enlarged organs.   MUSCULOSKELETAL: Skeletal configuration was normal and muscle mass was normal for age. Joint appearance was overall normal.  LYMPHATIC: There were no enlarged nodes.  SKIN/HAIR/NAILS: Skin color was normal.  There were no skin lesions.  Hair and nails were normal.  NEUROLOGIC: The patient was alert and oriented to person, place, time, and circumstance. Speech was normal. Cranial nerves were normal. Motor strength was normal for age. The patient was normally coordinated.  PSYCHIATRIC:  Mood and affect were normal and the patient had normal recent and remote memory. The patient's judgment and insight were normal.       Additional Information        Scott Ramirez MD  Internal Medicine  Contact me at [phone]  Answers for HPI/ROS submitted by the patient on 6/8/2023  In general, how would you rate your overall physical health?: good  Frequency of exercise:: 2-3 days/week  Do you usually eat at least 4  "servings of fruit and vegetables a day, include whole grains & fiber, and avoid regularly eating high fat or \"junk\" foods? : Yes  Taking medications regularly:: Not Applicable  Medication side effects:: Not applicable  Activities of Daily Living: no assistance needed  Home safety: no safety concerns identified  Hearing Impairment:: no hearing concerns  In the past 6 months, have you been bothered by leaking of urine?: No  abdominal pain: No  Blood in stool: No  Blood in urine: No  chest pain: No  chills: No  congestion: No  constipation: No  cough: No  diarrhea: No  dizziness: No  ear pain: No  eye pain: No  nervous/anxious: No  fever: No  frequency: Yes  genital sores: No  headaches: No  hearing loss: No  heartburn: Yes  arthralgias: No  joint swelling: No  peripheral edema: No  mood changes: No  myalgias: No  nausea: No  dysuria: No  palpitations: No  Skin sensation changes: No  sore throat: No  urgency: No  rash: No  shortness of breath: No  visual disturbance: No  weakness: No  impotence: No  penile discharge: No  In general, how would you rate your overall mental or emotional health?: good  Additional concerns today:: No  Duration of exercise:: 30-45 minutes      "

## 2023-06-08 NOTE — NURSING NOTE
Prior to immunization administration, verified patients identity using patient s name and date of birth. Please see Immunization Activity for additional information.     Screening Questionnaire for Adult Immunization    Are you sick today?   No   Do you have allergies to medications, food, a vaccine component or latex?   No   Have you ever had a serious reaction after receiving a vaccination?   No   Do you have a long-term health problem with heart, lung, kidney, or metabolic disease (e.g., diabetes), asthma, a blood disorder, no spleen, complement component deficiency, a cochlear implant, or a spinal fluid leak?  Are you on long-term aspirin therapy?   No   Do you have cancer, leukemia, HIV/AIDS, or any other immune system problem?   No   Do you have a parent, brother, or sister with an immune system problem?   No   In the past 3 months, have you taken medications that affect  your immune system, such as prednisone, other steroids, or anticancer drugs; drugs for the treatment of rheumatoid arthritis, Crohn s disease, or psoriasis; or have you had radiation treatments?   No   Have you had a seizure, or a brain or other nervous system problem?   No   During the past year, have you received a transfusion of blood or blood    products, or been given immune (gamma) globulin or antiviral drug?   No   For women: Are you pregnant or is there a chance you could become       pregnant during the next month?   No   Have you received any vaccinations in the past 4 weeks?   No     Immunization questionnaire answers were all negative.      Injection of Prevnar 20 given by Iqra Helms RN.     Pt tolerated injection. Site was cleansed with alcohol prior to injections. No pain, burning, swelling or redness at the site of the injection. Patient instructed to remain in clinic for 15 minutes afterwards, and to report any adverse reactions    Screening performed by Iqra Helms RN on 6/8/2023 at 3:03  PM.

## 2023-06-26 ENCOUNTER — TELEPHONE (OUTPATIENT)
Dept: INTERNAL MEDICINE | Facility: CLINIC | Age: 68
End: 2023-06-26
Payer: COMMERCIAL

## 2023-06-26 DIAGNOSIS — R35.0 BENIGN PROSTATIC HYPERPLASIA WITH URINARY FREQUENCY: ICD-10-CM

## 2023-06-26 DIAGNOSIS — N40.1 BENIGN PROSTATIC HYPERPLASIA WITH URINARY FREQUENCY: ICD-10-CM

## 2023-06-26 RX ORDER — TADALAFIL 5 MG/1
5 TABLET ORAL EVERY 24 HOURS
Qty: 90 TABLET | Refills: 4 | Status: SHIPPED | OUTPATIENT
Start: 2023-06-26 | End: 2024-07-05

## 2023-08-28 ENCOUNTER — TELEPHONE (OUTPATIENT)
Dept: INTERNAL MEDICINE | Facility: CLINIC | Age: 68
End: 2023-08-28
Payer: COMMERCIAL

## 2023-08-28 DIAGNOSIS — T75.3XXD MOTION SICKNESS, SUBSEQUENT ENCOUNTER: Primary | ICD-10-CM

## 2023-08-28 DIAGNOSIS — G47.09 OTHER INSOMNIA: ICD-10-CM

## 2023-08-28 RX ORDER — ZOLPIDEM TARTRATE 10 MG/1
5-10 TABLET ORAL
Qty: 20 TABLET | Refills: 0 | Status: SHIPPED | OUTPATIENT
Start: 2023-08-28

## 2023-08-28 RX ORDER — SCOLOPAMINE TRANSDERMAL SYSTEM 1 MG/1
1 PATCH, EXTENDED RELEASE TRANSDERMAL
Qty: 10 PATCH | Refills: 0 | Status: SHIPPED | OUTPATIENT
Start: 2023-08-28

## 2024-01-04 ENCOUNTER — PATIENT OUTREACH (OUTPATIENT)
Dept: GASTROENTEROLOGY | Facility: CLINIC | Age: 69
End: 2024-01-04
Payer: COMMERCIAL

## 2024-02-09 ENCOUNTER — TRANSFERRED RECORDS (OUTPATIENT)
Dept: HEALTH INFORMATION MANAGEMENT | Facility: CLINIC | Age: 69
End: 2024-02-09
Payer: COMMERCIAL

## 2024-05-09 ENCOUNTER — PATIENT OUTREACH (OUTPATIENT)
Dept: CARE COORDINATION | Facility: CLINIC | Age: 69
End: 2024-05-09
Payer: COMMERCIAL

## 2024-05-23 ENCOUNTER — PATIENT OUTREACH (OUTPATIENT)
Dept: CARE COORDINATION | Facility: CLINIC | Age: 69
End: 2024-05-23
Payer: COMMERCIAL

## 2024-07-01 ENCOUNTER — HOSPITAL ENCOUNTER (OUTPATIENT)
Dept: CARDIOLOGY | Facility: CLINIC | Age: 69
Discharge: HOME | End: 2024-07-01
Attending: STUDENT IN AN ORGANIZED HEALTH CARE EDUCATION/TRAINING PROGRAM | Admitting: STUDENT IN AN ORGANIZED HEALTH CARE EDUCATION/TRAINING PROGRAM
Payer: COMMERCIAL

## 2024-07-01 DIAGNOSIS — I49.3 PVC'S (PREMATURE VENTRICULAR CONTRACTIONS): ICD-10-CM

## 2024-07-01 DIAGNOSIS — I49.3 PVC'S (PREMATURE VENTRICULAR CONTRACTIONS): Primary | ICD-10-CM

## 2024-07-01 LAB — LVEF ECHO: NORMAL

## 2024-07-01 PROCEDURE — 93325 DOPPLER ECHO COLOR FLOW MAPG: CPT | Mod: 26 | Performed by: INTERNAL MEDICINE

## 2024-07-01 PROCEDURE — 93325 DOPPLER ECHO COLOR FLOW MAPG: CPT

## 2024-07-01 PROCEDURE — 93308 TTE F-UP OR LMTD: CPT | Mod: 26 | Performed by: INTERNAL MEDICINE

## 2024-07-01 PROCEDURE — 93321 DOPPLER ECHO F-UP/LMTD STD: CPT | Mod: 26 | Performed by: INTERNAL MEDICINE

## 2024-07-03 DIAGNOSIS — R35.0 BENIGN PROSTATIC HYPERPLASIA WITH URINARY FREQUENCY: ICD-10-CM

## 2024-07-03 DIAGNOSIS — N40.1 BENIGN PROSTATIC HYPERPLASIA WITH URINARY FREQUENCY: ICD-10-CM

## 2024-07-05 ENCOUNTER — TELEPHONE (OUTPATIENT)
Dept: INTERNAL MEDICINE | Facility: CLINIC | Age: 69
End: 2024-07-05
Payer: COMMERCIAL

## 2024-07-05 DIAGNOSIS — N40.1 BENIGN PROSTATIC HYPERPLASIA WITH URINARY FREQUENCY: ICD-10-CM

## 2024-07-05 DIAGNOSIS — R35.0 BENIGN PROSTATIC HYPERPLASIA WITH URINARY FREQUENCY: ICD-10-CM

## 2024-07-05 RX ORDER — TADALAFIL 5 MG/1
5 TABLET ORAL DAILY
Qty: 90 TABLET | Refills: 0 | Status: SHIPPED | OUTPATIENT
Start: 2024-07-05 | End: 2024-07-09

## 2024-07-05 NOTE — TELEPHONE ENCOUNTER
"[facility] Prior Authorization Team   Phone: [phone]      PRIOR AUTHORIZATION DENIED    Medication: TADALAFIL 5 MG PO TABS  Insurance Company: [employer] - Phone [phone] Fax [phone]  Denial Date: 7/5/2024  Denial Reason(s):           Appeal Information: To send an appeal to the patient's insurance, please provide a letter of medical necessity for the requested medication that was denied.  Please use the denial rationale from the patient's insurance to write the letter.  Once it is completed, please have it available under the \"letters tab\" in the patient's chart and route directly back to me: CHARLY GAY and I can send the appeal to the patient's insurance.       Patient Notified: No. The [facility] PA Team does not notify of the denial outcomes as the patient often will ask what their provider will prescribe in place of the denied medication, or additional information in regards to other therapies they can take in place of the denied medication.  This is not something we can advise on in our department    "

## 2024-07-09 RX ORDER — TADALAFIL 10 MG/1
10 TABLET ORAL DAILY
Qty: 90 TABLET | Refills: 4 | Status: SHIPPED | OUTPATIENT
Start: 2024-07-09

## 2024-08-18 ENCOUNTER — HEALTH MAINTENANCE LETTER (OUTPATIENT)
Age: 69
End: 2024-08-18

## 2024-10-04 ENCOUNTER — TELEPHONE (OUTPATIENT)
Dept: INTERNAL MEDICINE | Facility: CLINIC | Age: 69
End: 2024-10-04
Payer: COMMERCIAL

## 2024-10-04 NOTE — TELEPHONE ENCOUNTER
----- Message from Scott Ramirez sent at 10/4/2024 12:05 PM CDT -----  Please schedule pt for physical on 10/14 at 1p.  He is aware.  Thanks!

## 2024-10-10 SDOH — HEALTH STABILITY: PHYSICAL HEALTH: ON AVERAGE, HOW MANY MINUTES DO YOU ENGAGE IN EXERCISE AT THIS LEVEL?: 50 MIN

## 2024-10-10 SDOH — HEALTH STABILITY: PHYSICAL HEALTH: ON AVERAGE, HOW MANY DAYS PER WEEK DO YOU ENGAGE IN MODERATE TO STRENUOUS EXERCISE (LIKE A BRISK WALK)?: 3 DAYS

## 2024-10-10 ASSESSMENT — SOCIAL DETERMINANTS OF HEALTH (SDOH): HOW OFTEN DO YOU GET TOGETHER WITH FRIENDS OR RELATIVES?: TWICE A WEEK

## 2024-10-14 ENCOUNTER — OFFICE VISIT (OUTPATIENT)
Dept: INTERNAL MEDICINE | Facility: CLINIC | Age: 69
End: 2024-10-14
Payer: COMMERCIAL

## 2024-10-14 VITALS
HEIGHT: 72 IN | OXYGEN SATURATION: 100 % | TEMPERATURE: 96.8 F | DIASTOLIC BLOOD PRESSURE: 74 MMHG | HEART RATE: 64 BPM | RESPIRATION RATE: 16 BRPM | SYSTOLIC BLOOD PRESSURE: 122 MMHG | BODY MASS INDEX: 21.59 KG/M2 | WEIGHT: 159.4 LBS

## 2024-10-14 DIAGNOSIS — K21.9 GASTROESOPHAGEAL REFLUX DISEASE WITHOUT ESOPHAGITIS: ICD-10-CM

## 2024-10-14 DIAGNOSIS — Z12.5 SCREENING FOR PROSTATE CANCER: ICD-10-CM

## 2024-10-14 DIAGNOSIS — Z13.220 SCREENING FOR HYPERLIPIDEMIA: ICD-10-CM

## 2024-10-14 DIAGNOSIS — I49.3 PVC'S (PREMATURE VENTRICULAR CONTRACTIONS): ICD-10-CM

## 2024-10-14 DIAGNOSIS — Z00.00 ANNUAL PHYSICAL EXAM: Primary | ICD-10-CM

## 2024-10-14 DIAGNOSIS — Z13.1 SCREENING FOR DIABETES MELLITUS: ICD-10-CM

## 2024-10-14 DIAGNOSIS — Z86.0100 HISTORY OF COLONIC POLYPS: ICD-10-CM

## 2024-10-14 DIAGNOSIS — T75.3XXD MOTION SICKNESS, SUBSEQUENT ENCOUNTER: ICD-10-CM

## 2024-10-14 DIAGNOSIS — N40.1 BENIGN PROSTATIC HYPERPLASIA WITH URINARY FREQUENCY: ICD-10-CM

## 2024-10-14 DIAGNOSIS — R35.0 BENIGN PROSTATIC HYPERPLASIA WITH URINARY FREQUENCY: ICD-10-CM

## 2024-10-14 LAB
ALBUMIN SERPL BCG-MCNC: 4.5 G/DL (ref 3.5–5.2)
ALBUMIN UR-MCNC: NEGATIVE MG/DL
ALP SERPL-CCNC: 61 U/L (ref 40–150)
ALT SERPL W P-5'-P-CCNC: 16 U/L (ref 0–70)
ANION GAP SERPL CALCULATED.3IONS-SCNC: 10 MMOL/L (ref 7–15)
APPEARANCE UR: CLEAR
AST SERPL W P-5'-P-CCNC: 27 U/L (ref 0–45)
BILIRUB SERPL-MCNC: 0.7 MG/DL
BILIRUB UR QL STRIP: NEGATIVE
BUN SERPL-MCNC: 11.9 MG/DL (ref 8–23)
CALCIUM SERPL-MCNC: 9.3 MG/DL (ref 8.8–10.4)
CHLORIDE SERPL-SCNC: 102 MMOL/L (ref 98–107)
CHOLEST SERPL-MCNC: 220 MG/DL
COLOR UR AUTO: YELLOW
CREAT SERPL-MCNC: 1.06 MG/DL (ref 0.67–1.17)
EGFRCR SERPLBLD CKD-EPI 2021: 77 ML/MIN/1.73M2
ERYTHROCYTE [DISTWIDTH] IN BLOOD BY AUTOMATED COUNT: 13 % (ref 10–15)
EST. AVERAGE GLUCOSE BLD GHB EST-MCNC: 108 MG/DL
FASTING STATUS PATIENT QL REPORTED: ABNORMAL
FASTING STATUS PATIENT QL REPORTED: NORMAL
GLUCOSE SERPL-MCNC: 95 MG/DL (ref 70–99)
GLUCOSE UR STRIP-MCNC: NEGATIVE MG/DL
HBA1C MFR BLD: 5.4 % (ref 0–5.6)
HCO3 SERPL-SCNC: 26 MMOL/L (ref 22–29)
HCT VFR BLD AUTO: 47.9 % (ref 40–53)
HDLC SERPL-MCNC: 92 MG/DL
HGB BLD-MCNC: 15.1 G/DL (ref 13.3–17.7)
HGB UR QL STRIP: NEGATIVE
KETONES UR STRIP-MCNC: NEGATIVE MG/DL
LDLC SERPL CALC-MCNC: 114 MG/DL
LEUKOCYTE ESTERASE UR QL STRIP: NEGATIVE
MCH RBC QN AUTO: 30.3 PG (ref 26.5–33)
MCHC RBC AUTO-ENTMCNC: 31.5 G/DL (ref 31.5–36.5)
MCV RBC AUTO: 96 FL (ref 78–100)
NITRATE UR QL: NEGATIVE
NONHDLC SERPL-MCNC: 128 MG/DL
PH UR STRIP: 7 [PH] (ref 5–8)
PLATELET # BLD AUTO: 173 10E3/UL (ref 150–450)
POTASSIUM SERPL-SCNC: 3.9 MMOL/L (ref 3.4–5.3)
PROT SERPL-MCNC: 7 G/DL (ref 6.4–8.3)
PSA SERPL DL<=0.01 NG/ML-MCNC: 2.68 NG/ML (ref 0–4.5)
RBC # BLD AUTO: 4.98 10E6/UL (ref 4.4–5.9)
SODIUM SERPL-SCNC: 138 MMOL/L (ref 135–145)
SP GR UR STRIP: 1.01 (ref 1–1.03)
TRIGL SERPL-MCNC: 68 MG/DL
UROBILINOGEN UR STRIP-ACNC: 0.2 E.U./DL
WBC # BLD AUTO: 4.3 10E3/UL (ref 4–11)

## 2024-10-14 PROCEDURE — 90480 ADMN SARSCOV2 VAC 1/ONLY CMP: CPT | Performed by: INTERNAL MEDICINE

## 2024-10-14 PROCEDURE — 80053 COMPREHEN METABOLIC PANEL: CPT | Performed by: INTERNAL MEDICINE

## 2024-10-14 PROCEDURE — 85027 COMPLETE CBC AUTOMATED: CPT | Performed by: INTERNAL MEDICINE

## 2024-10-14 PROCEDURE — 36415 COLL VENOUS BLD VENIPUNCTURE: CPT | Performed by: INTERNAL MEDICINE

## 2024-10-14 PROCEDURE — 99214 OFFICE O/P EST MOD 30 MIN: CPT | Mod: 25 | Performed by: INTERNAL MEDICINE

## 2024-10-14 PROCEDURE — 99397 PER PM REEVAL EST PAT 65+ YR: CPT | Mod: 25 | Performed by: INTERNAL MEDICINE

## 2024-10-14 PROCEDURE — 90662 IIV NO PRSV INCREASED AG IM: CPT | Performed by: INTERNAL MEDICINE

## 2024-10-14 PROCEDURE — 91320 SARSCV2 VAC 30MCG TRS-SUC IM: CPT | Performed by: INTERNAL MEDICINE

## 2024-10-14 PROCEDURE — 80061 LIPID PANEL: CPT | Performed by: INTERNAL MEDICINE

## 2024-10-14 PROCEDURE — 81003 URINALYSIS AUTO W/O SCOPE: CPT | Performed by: INTERNAL MEDICINE

## 2024-10-14 PROCEDURE — G0103 PSA SCREENING: HCPCS | Performed by: INTERNAL MEDICINE

## 2024-10-14 PROCEDURE — 90471 IMMUNIZATION ADMIN: CPT | Performed by: INTERNAL MEDICINE

## 2024-10-14 PROCEDURE — 83036 HEMOGLOBIN GLYCOSYLATED A1C: CPT | Performed by: INTERNAL MEDICINE

## 2024-10-14 RX ORDER — SCOLOPAMINE TRANSDERMAL SYSTEM 1 MG/1
1 PATCH, EXTENDED RELEASE TRANSDERMAL
COMMUNITY
Start: 2024-10-14

## 2024-10-14 NOTE — NURSING NOTE
Patient tolerated Covid and Flu injection without incident. Injection sites free from redness, swelling, and pain. Patient discharged in the care of lab. Patient aware of next appointment.

## 2024-10-14 NOTE — PROGRESS NOTES
Office Visit - Physical   Scott Saucedo   68 year old  male    Date of visit: 10/14/2024  Physician: Scott Ramirez MD     Assessment and Plan   1. Annual physical exam  This is a 68-year-old man with issues as discussed below.  Ongoing healthy lifestyle discussed and recommended    2. Screening for hyperlipidemia  - Lipid panel reflex to direct LDL Fasting; Future  - Lipid panel reflex to direct LDL Fasting    3. Screening for diabetes mellitus  - Hemoglobin A1c; Future  - Hemoglobin A1c    4. Screening for prostate cancer  - Prostate Specific Antigen Screen; Future  - Prostate Specific Antigen Screen    5. History of colonic polyps  5-year follow-up recommended from 2023    6. PVC's (premature ventricular contractions)  Singular calm down, slight reduction in ejection fraction but asymptomatic    7. Benign prostatic hyperplasia with urinary frequency  Continue with Cialis    8. Gastroesophageal reflux disease without esophagitis  Continue with behavioral modification dietary modification  - CBC with platelets; Future  - Comprehensive metabolic panel; Future  - UA Macroscopic with reflex to Microscopic and Culture; Future  - CBC with platelets  - Comprehensive metabolic panel  - UA Macroscopic with reflex to Microscopic and Culture    9. Motion sickness, subsequent encounter  - scopolamine (TRANSDERM) 1 MG/3DAYS 72 hr patch; Place 1 patch onto the skin every 72 hours.    Return in about 1 year (around 10/14/2025) for Routine preventive.     Chief Complaint   Chief Complaint   Patient presents with    Annual Visit        Patient Profile   Social History     Social History Narrative    He lives with his wife Deborah. 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  Seven grandchildren.         Past Medical History   Patient Active Problem List   Diagnosis    H/o basal cell - [facility]    History of colonic polyps       Past Surgical History  He has a past  surgical history that includes Laminectomy And Microdiscectomy Cervical Spine (1988).     History of Present Illness   This 68 year old man comes in for annual physical.  Overall he is feeling quite well.  Recently bike to 100 miles, no cardiovascular issues.  Has had some PVCs which seem to have quieted down.  Ejection fraction a little bit reduced at 49% without symptoms.  He has BPH and urinary frequency which is moderately well-controlled with tadalafil.  Reflux symptoms are manageable with dietary and behavioral modification.  Occasional motion sickness with travel and jet lag.    Review of Systems: A comprehensive review of systems was negative except as noted.     Medications and Allergies   Current Outpatient Medications   Medication Sig Dispense Refill    scopolamine (TRANSDERM) 1 MG/3DAYS 72 hr patch Place 1 patch onto the skin every 72 hours.      tadalafil (CIALIS) 10 MG tablet Take 1 tablet (10 mg) by mouth daily 90 tablet 4    zolpidem (AMBIEN) 10 MG tablet Take 0.5-1 tablets (5-10 mg) by mouth nightly as needed for sleep (Patient not taking: Reported on 10/14/2024) 20 tablet 0     No Known Allergies     Family and Social History   Family History   Problem Relation Age of Onset    Hypertension Mother     Lung Cancer Mother     Aortic stenosis Mother     Diabetes Father     Hypertension Father     Obesity Father     Atrial fibrillation Brother     Melanoma Brother     Developmental delay Brother     Seizure Disorder Brother     Hypertension Brother     No Known Problems Daughter     No Known Problems Daughter     No Known Problems Son         Social History     Tobacco Use    Smoking status: Never    Smokeless tobacco: Never   Vaping Use    Vaping status: Never Used   Substance Use Topics    Alcohol use: Yes     Alcohol/week: 14.0 standard drinks of alcohol    Drug use: No        Physical Exam   General Appearance:   No acute distress    /74 (BP Location: Left arm, Patient Position: Sitting, Cuff  "Size: Adult Regular)   Pulse 64   Temp 96.8  F (36  C) (Tympanic)   Resp 16   Ht 1.821 m (5' 11.7\")   Wt 72.3 kg (159 lb 6.4 oz)   SpO2 100%   BMI 21.80 kg/m      EYES: Eyelids, conjunctiva, and sclera were normal. Pupils were normal. Cornea, iris, and lens were normal bilaterally.  HEAD, EARS, NOSE, MOUTH, AND THROAT: Head and face were normal. Hearing was normal to voice and the ears were normal to external exam. Nose appearance was normal and there was no discharge.   NECK: Neck appearance was normal.   RESPIRATORY: Breathing pattern was normal and the chest moved symmetrically.    Lung sounds were normal and there were no abnormal sounds.  CARDIOVASCULAR: Heart rate and rhythm were normal.  S1 and S2 were normal and there were no extra sounds or murmurs. Peripheral pulses in arms and legs were normal.  There was no peripheral edema.  GASTROINTESTINAL: The abdomen was normal in contour.    SKIN/HAIR/NAILS: Skin color was normal.   NEUROLOGIC: The patient was alert and oriented to person, place, time, and circumstance. Speech was normal. Cranial nerves were normal. Motor strength was normal for age. The patient was normally coordinated.  PSYCHIATRIC:  Mood and affect were normal and the patient had normal recent and remote memory. The patient's judgment and insight were normal.       Additional Information        Scott Ramirez MD  Internal Medicine  Contact me at [phone]  "

## 2024-12-06 ENCOUNTER — TRANSFERRED RECORDS (OUTPATIENT)
Dept: HEALTH INFORMATION MANAGEMENT | Facility: CLINIC | Age: 69
End: 2024-12-06
Payer: COMMERCIAL

## 2025-04-13 ENCOUNTER — HEALTH MAINTENANCE LETTER (OUTPATIENT)
Age: 70
End: 2025-04-13

## 2025-04-13 ENCOUNTER — MYC MEDICAL ADVICE (OUTPATIENT)
Dept: INTERNAL MEDICINE | Facility: CLINIC | Age: 70
End: 2025-04-13
Payer: COMMERCIAL

## 2025-04-13 DIAGNOSIS — G47.09 OTHER INSOMNIA: ICD-10-CM

## 2025-04-13 DIAGNOSIS — T75.3XXD MOTION SICKNESS, SUBSEQUENT ENCOUNTER: ICD-10-CM

## 2025-04-22 RX ORDER — SCOPOLAMINE 1 MG/3D
1 PATCH, EXTENDED RELEASE TRANSDERMAL
Qty: 8 PATCH | Refills: 0 | Status: SHIPPED | OUTPATIENT
Start: 2025-04-22

## 2025-04-22 RX ORDER — ZOLPIDEM TARTRATE 10 MG/1
5-10 TABLET ORAL
Qty: 25 TABLET | Refills: 0 | Status: SHIPPED | OUTPATIENT
Start: 2025-04-22

## 2025-07-06 ENCOUNTER — TRANSFERRED RECORDS (OUTPATIENT)
Dept: HEALTH INFORMATION MANAGEMENT | Facility: CLINIC | Age: 70
End: 2025-07-06
Payer: COMMERCIAL

## 2025-07-23 DIAGNOSIS — M25.551 HIP PAIN, RIGHT: Primary | ICD-10-CM
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
## 2025-07-24 DIAGNOSIS — M25.551 HIP PAIN, RIGHT: Primary | ICD-10-CM

## 2025-07-25 ENCOUNTER — RESULTS FOLLOW-UP (OUTPATIENT)
Dept: FAMILY MEDICINE | Facility: CLINIC | Age: 70
End: 2025-07-25

## 2025-07-25 ENCOUNTER — ANCILLARY PROCEDURE (OUTPATIENT)
Dept: MRI IMAGING | Facility: CLINIC | Age: 70
End: 2025-07-25
Attending: PHYSICIAN ASSISTANT
Payer: COMMERCIAL

## 2025-07-25 DIAGNOSIS — N40.1 BENIGN PROSTATIC HYPERPLASIA WITH URINARY FREQUENCY: ICD-10-CM

## 2025-07-25 DIAGNOSIS — M25.551 HIP PAIN, RIGHT: ICD-10-CM

## 2025-07-25 DIAGNOSIS — R35.0 BENIGN PROSTATIC HYPERPLASIA WITH URINARY FREQUENCY: ICD-10-CM

## 2025-07-25 PROCEDURE — 73723 MRI JOINT LWR EXTR W/O&W/DYE: CPT | Mod: 26 | Performed by: RADIOLOGY

## 2025-07-25 PROCEDURE — 73723 MRI JOINT LWR EXTR W/O&W/DYE: CPT | Mod: RT

## 2025-07-25 PROCEDURE — 255N000002 HC RX 255 OP 636: Performed by: PHYSICIAN ASSISTANT

## 2025-07-25 PROCEDURE — A9585 GADOBUTROL INJECTION: HCPCS | Performed by: PHYSICIAN ASSISTANT

## 2025-07-25 RX ORDER — GADOBUTROL 604.72 MG/ML
7.5 INJECTION INTRAVENOUS ONCE
Status: COMPLETED | OUTPATIENT
Start: 2025-07-25 | End: 2025-07-25

## 2025-07-25 RX ADMIN — GADOBUTROL 7.5 ML: 604.72 INJECTION INTRAVENOUS at 13:22

## 2025-08-28 RX ORDER — TADALAFIL 10 MG/1
10 TABLET ORAL DAILY
Qty: 90 TABLET | Refills: 4 | Status: SHIPPED | OUTPATIENT
Start: 2025-08-28

## 2025-08-29 ENCOUNTER — MYC MEDICAL ADVICE (OUTPATIENT)
Dept: INTERNAL MEDICINE | Facility: CLINIC | Age: 70
End: 2025-08-29
Payer: COMMERCIAL

## 2025-08-29 DIAGNOSIS — K21.9 GASTROESOPHAGEAL REFLUX DISEASE WITHOUT ESOPHAGITIS: ICD-10-CM

## 2025-08-29 DIAGNOSIS — R35.0 BENIGN PROSTATIC HYPERPLASIA WITH URINARY FREQUENCY: ICD-10-CM

## 2025-08-29 DIAGNOSIS — E78.00 PURE HYPERCHOLESTEROLEMIA: ICD-10-CM

## 2025-08-29 DIAGNOSIS — Z13.1 SCREENING FOR DIABETES MELLITUS: ICD-10-CM

## 2025-08-29 DIAGNOSIS — N40.1 BENIGN PROSTATIC HYPERPLASIA WITH URINARY FREQUENCY: ICD-10-CM

## 2025-08-29 DIAGNOSIS — I49.3 PVC'S (PREMATURE VENTRICULAR CONTRACTIONS): Primary | ICD-10-CM

## 2025-08-29 DIAGNOSIS — Z12.5 SCREENING FOR PROSTATE CANCER: ICD-10-CM

## 2025-09-04 ENCOUNTER — OFFICE VISIT (OUTPATIENT)
Dept: CARDIOLOGY | Facility: CLINIC | Age: 70
End: 2025-09-04
Payer: COMMERCIAL

## 2025-09-04 VITALS
OXYGEN SATURATION: 98 % | HEART RATE: 63 BPM | WEIGHT: 160 LBS | DIASTOLIC BLOOD PRESSURE: 70 MMHG | BODY MASS INDEX: 21.88 KG/M2 | SYSTOLIC BLOOD PRESSURE: 135 MMHG

## 2025-09-04 DIAGNOSIS — I49.3 PVC'S (PREMATURE VENTRICULAR CONTRACTIONS): Primary | ICD-10-CM
